# Patient Record
Sex: FEMALE | Race: BLACK OR AFRICAN AMERICAN | NOT HISPANIC OR LATINO | Employment: FULL TIME | URBAN - METROPOLITAN AREA
[De-identification: names, ages, dates, MRNs, and addresses within clinical notes are randomized per-mention and may not be internally consistent; named-entity substitution may affect disease eponyms.]

---

## 2018-08-28 ENCOUNTER — OFFICE VISIT (OUTPATIENT)
Dept: URGENT CARE | Facility: CLINIC | Age: 33
End: 2018-08-28
Payer: COMMERCIAL

## 2018-08-28 VITALS
TEMPERATURE: 98 F | WEIGHT: 201.25 LBS | BODY MASS INDEX: 28.81 KG/M2 | HEART RATE: 96 BPM | RESPIRATION RATE: 16 BRPM | HEIGHT: 70 IN | OXYGEN SATURATION: 99 %

## 2018-08-28 DIAGNOSIS — B34.9 VIRAL SYNDROME: Primary | ICD-10-CM

## 2018-08-28 DIAGNOSIS — Z20.818 EXPOSURE TO STREP THROAT: ICD-10-CM

## 2018-08-28 LAB
CTP QC/QA: YES
S PYO RRNA THROAT QL PROBE: NEGATIVE

## 2018-08-28 PROCEDURE — 87880 STREP A ASSAY W/OPTIC: CPT | Mod: QW,S$GLB,, | Performed by: NURSE PRACTITIONER

## 2018-08-28 PROCEDURE — 99214 OFFICE O/P EST MOD 30 MIN: CPT | Mod: S$GLB,,, | Performed by: NURSE PRACTITIONER

## 2018-08-28 PROCEDURE — 99999 PR PBB SHADOW E&M-NEW PATIENT-LVL IV: CPT | Mod: PBBFAC,,, | Performed by: NURSE PRACTITIONER

## 2018-08-28 PROCEDURE — 87081 CULTURE SCREEN ONLY: CPT

## 2018-08-28 RX ORDER — OSELTAMIVIR PHOSPHATE 75 MG/1
75 CAPSULE ORAL 2 TIMES DAILY
Qty: 10 CAPSULE | Refills: 0 | Status: SHIPPED | OUTPATIENT
Start: 2018-08-28 | End: 2018-09-02

## 2018-08-28 NOTE — PROGRESS NOTES
"Subjective:      Patient ID: Saad Garvin is a 33 y.o. female.    Chief Complaint: No chief complaint on file.    Headache    This is a new problem. The current episode started in the past 7 days (2 days). The problem occurs constantly. The problem has been unchanged. The pain quality is similar to prior headaches. Associated symptoms include muscle aches and nausea. Pertinent negatives include no abdominal pain, coughing, ear pain, eye pain, eye redness, phonophobia, photophobia, sinus pressure or sore throat. Associated symptoms comments: Work contacts with strep.. Nothing aggravates the symptoms. Treatments tried: ibuprofen last night for the headache and body aches; also taking Zofran for the nausea. The treatment provided moderate relief.     Review of Systems   Constitutional: Positive for chills.   HENT: Negative.  Negative for congestion, ear pain, sinus pressure and sore throat.    Eyes: Negative.  Negative for photophobia, pain and redness.   Respiratory: Negative.  Negative for cough.    Cardiovascular: Negative.    Gastrointestinal: Positive for nausea. Negative for abdominal pain.   Genitourinary: Negative.    Musculoskeletal: Positive for myalgias.   Skin: Negative.    Neurological: Positive for headaches.   Hematological: Negative.        Objective:   Pulse 96   Temp 97.5 °F (36.4 °C) (Tympanic)   Resp 16   Ht 5' 9.5" (1.765 m)   Wt 91.3 kg (201 lb 4.5 oz)   LMP 08/24/2018   SpO2 99%   BMI 29.30 kg/m²   Physical Exam   Constitutional: She is oriented to person, place, and time. She appears well-developed and well-nourished. No distress.   HENT:   Head: Normocephalic and atraumatic.   Right Ear: Tympanic membrane normal.   Left Ear: Tympanic membrane normal.   Nose: Nose normal. No mucosal edema or sinus tenderness.   Mouth/Throat: Uvula is midline, oropharynx is clear and moist and mucous membranes are normal.   Eyes: Conjunctivae are normal.   Neck: Normal range of motion. Neck supple. "   Cardiovascular: Normal rate, regular rhythm and normal heart sounds.   Pulmonary/Chest: Effort normal and breath sounds normal. No respiratory distress.   Abdominal: Soft. Bowel sounds are normal. She exhibits no distension. Mass: has a history of large fibroids; palpable in suprapubic region. There is no tenderness. There is no rebound and no guarding. No hernia.   Musculoskeletal: Normal range of motion. She exhibits no edema.   Lymphadenopathy:     She has no cervical adenopathy.   Neurological: She is alert and oriented to person, place, and time.   Skin: Skin is warm and dry. She is not diaphoretic.   Nursing note and vitals reviewed.    Assessment:      1. Viral syndrome    2. Exposure to strep throat       Plan:   Viral syndrome  -     oseltamivir (TAMIFLU) 75 MG capsule; Take 1 capsule (75 mg total) by mouth 2 (two) times daily. for 5 days  Dispense: 10 capsule; Refill: 0    Exposure to strep throat  -     POCT Rapid Strep A  -     Strep A culture, throat    Printed Rx for Tamiflu. Ms. Garvin will fill the Tamiflu if she starts to run fever.   Supportive care measures: increase fluids, Ibuprofen PRN headache/body aches, Zofran for nausea.  Ms. Garvin has enough Zofran at home -- she'll call back if she starts running low.  Rapid strep is negative. Culture pending.  Instructions, follow up, and supportive care as per AVS.

## 2018-08-28 NOTE — LETTER
August 28, 2018      [facility]  [address] [facility] [address]  [place] [address]  Phone: [phone]  Fax: [phone]       Patient: Saad Garvin   YOB: 1985  Date of Visit: 08/28/2018    To Whom It May Concern:    Stacy Garvin  was at [facility] on 08/28/2018. She may return to work/school on 8/30/18 with no restrictions. If you have any questions or concerns, or if I can be of further assistance, please do not hesitate to contact me.    Sincerely,    Babita Ferris, NP

## 2018-08-28 NOTE — PATIENT INSTRUCTIONS
"· Your symptoms are likely due to a viral infection. These infections can last up to 14 days, but you should notice some improvement of your symptoms within the first 7-10 days. Viral infections will not improve with antibiotics. If your symptoms persist >10 days without improvement or if you have any new or worsening symptoms, this is an indication that you may have developed a bacterial infection and should return to your primary care provider or to Urgent Care.   · Getting plenty of rest is very important to fighting infections.  · Increase fluids.   · May apply warm compresses as needed for facial pain and congestion.   · Saline nasal spray to loosen nasal congestion.  · Flonase or Nasacort to reduce inflammation in the sinus cavities.  · You may take an over the counter antihistamine for allergy symptoms such as sneezing, itchy/watery eyes, scratchy throat, or congestion.  · Take Tylenol or Ibuprofen as needed for sore throat, body aches, or fever.  · Don't drive, drink alcohol, or take any other medication or substance that causes sedation while taking cough syrup.   · Follow up with your primary care provider if symptoms persist >10 days or sooner for any new or worsening symptoms.   · Go to the ER for any fever that does not improve with Tylenol/Ibuprofen, neck stiffness, rash, severe headache, vision changes, shortness of breath, chest pain, facial swelling, severe facial pain, or any other new and concerning symptoms.     Viral Syndrome (Adult)  A viral illness may cause a number of symptoms. The symptoms depend on the part of the body that the virus affects. If it settles in your nose, throat, and lungs, it may cause cough, sore throat, congestion, and sometimes headache. If it settles in your stomach and intestinal tract, it may cause vomiting and diarrhea. Sometimes it causes vague symptoms like "aching all over," feeling tired, loss of appetite, or fever.  A viral illness usually lasts 1 to 2 weeks, " but sometimes it lasts longer. In some cases, a more serious infection can look like a viral syndrome in the first few days of the illness. You may need another exam and additional tests to know the difference. Watch for the warning signs listed below.  Home care  Follow these guidelines for taking care of yourself at home:  · If symptoms are severe, rest at home for the first 2 to 3 days.  · Stay away from cigarette smoke - both your smoke and the smoke from others.  · You may use over-the-counter acetaminophen or ibuprofen for fever, muscle aching, and headache, unless another medicine was prescribed for this. If you have chronic liver or kidney disease or ever had a stomach ulcer or GI bleeding, talk with your doctor before using these medicines. No one who is younger than 18 and ill with a fever should take aspirin. It may cause severe disease or death.  · Your appetite may be poor, so a light diet is fine. Avoid dehydration by drinking 8 to 12 8-ounce glasses of fluids each day. This may include water; orange juice; lemonade; apple, grape, and cranberry juice; clear fruit drinks; electrolyte replacement and sports drinks; and decaffeinated teas and coffee. If you have been diagnosed with a kidney disease, ask your doctor how much and what types of fluids you should drink to prevent dehydration. If you have kidney disease, drinking too much fluid can cause it build up in the your body and be dangerous to your health.  · Over-the-counter remedies won't shorten the length of the illness but may be helpful for cough, sore throat; and nasal and sinus congestion. Don't use decongestants if you have high blood pressure.  Follow-up care  Follow up with your healthcare provider if you do not improve over the next week.  Call 911  Get emergency medical care if any of the following occur:  · Convulsion  · Feeling weak, dizzy, or like you are going to faint  · Chest pain, shortness of breath, wheezing, or difficulty  breathing  When to seek medical advice  Call your healthcare provider right away if any of these occur:  · Cough with lots of colored sputum (mucus) or blood in your sputum  · Chest pain, shortness of breath, wheezing, or difficulty breathing  · Severe headache; face, neck, or ear pain  · Severe, constant pain in the lower right side of your belly (abdominal)  · Continued vomiting (cant keep liquids down)  · Frequent diarrhea (more than 5 times a day); blood (red or black color) or mucus in diarrhea  · Feeling weak, dizzy, or like you are going to faint  · Extreme thirst  · Fever of 100.4°F (38°C) or higher, or as directed by your healthcare provider  Date Last Reviewed: 9/25/2015  © 5767-0695 [employer]. [address] [place] [address], [place] [address]. All rights reserved. This information is not intended as a substitute for professional medical care. Always follow your healthcare professional's instructions.

## 2018-08-30 LAB — BACTERIA THROAT CULT: NORMAL

## 2018-11-11 ENCOUNTER — OFFICE VISIT (OUTPATIENT)
Dept: URGENT CARE | Facility: CLINIC | Age: 33
End: 2018-11-11
Payer: COMMERCIAL

## 2018-11-11 VITALS
RESPIRATION RATE: 16 BRPM | HEIGHT: 70 IN | DIASTOLIC BLOOD PRESSURE: 74 MMHG | WEIGHT: 202.25 LBS | OXYGEN SATURATION: 99 % | SYSTOLIC BLOOD PRESSURE: 112 MMHG | HEART RATE: 106 BPM | BODY MASS INDEX: 28.95 KG/M2 | TEMPERATURE: 98 F

## 2018-11-11 DIAGNOSIS — H66.91 OTITIS OF RIGHT EAR: ICD-10-CM

## 2018-11-11 DIAGNOSIS — J01.90 ACUTE SINUSITIS, RECURRENCE NOT SPECIFIED, UNSPECIFIED LOCATION: Primary | ICD-10-CM

## 2018-11-11 PROCEDURE — 99214 OFFICE O/P EST MOD 30 MIN: CPT | Mod: S$GLB,,, | Performed by: NURSE PRACTITIONER

## 2018-11-11 PROCEDURE — 99999 PR PBB SHADOW E&M-EST. PATIENT-LVL III: CPT | Mod: PBBFAC,,, | Performed by: NURSE PRACTITIONER

## 2018-11-11 PROCEDURE — 3008F BODY MASS INDEX DOCD: CPT | Mod: CPTII,S$GLB,, | Performed by: NURSE PRACTITIONER

## 2018-11-11 RX ORDER — CIPROFLOXACIN AND DEXAMETHASONE 3; 1 MG/ML; MG/ML
4 SUSPENSION/ DROPS AURICULAR (OTIC) 2 TIMES DAILY
Qty: 7.5 ML | Refills: 0 | Status: SHIPPED | OUTPATIENT
Start: 2018-11-11 | End: 2019-03-15

## 2018-11-11 RX ORDER — LEVOFLOXACIN 500 MG/1
500 TABLET, FILM COATED ORAL DAILY
Qty: 5 TABLET | Refills: 0 | Status: SHIPPED | OUTPATIENT
Start: 2018-11-11 | End: 2019-03-15

## 2018-11-11 NOTE — PROGRESS NOTES
Subjective:       Patient ID: Saad Garvin is a 33 y.o. female.    Chief Complaint: Ear Drainage (right ear); Ear Fullness; and Otalgia    Patient presents with right ear pain that started yesterday.  Reports severe pain last night with some relief after having some drainage.        Otalgia    There is pain in the right ear. This is a new problem. The current episode started yesterday. The problem has been unchanged. There has been no fever. Associated symptoms include rhinorrhea. Pertinent negatives include no coughing. Treatments tried: Allegra  The treatment provided no relief.   Sinusitis   This is a new problem. The current episode started 1 to 4 weeks ago. The problem is unchanged. There has been no fever. Associated symptoms include congestion, ear pain and sinus pressure. Pertinent negatives include no chills, coughing or shortness of breath. Treatments tried: antihistamine. The treatment provided no relief.     Review of Systems   Constitutional: Negative for chills and fatigue.   HENT: Positive for congestion, ear pain, postnasal drip, rhinorrhea and sinus pressure.    Respiratory: Negative for cough and shortness of breath.    Musculoskeletal: Negative for arthralgias and gait problem.   Skin: Negative for color change and wound.   Psychiatric/Behavioral: Negative for agitation and confusion.       Objective:      Physical Exam   Constitutional: She is oriented to person, place, and time. Vital signs are normal. She appears well-developed and well-nourished.   HENT:   Head: Normocephalic and atraumatic.   Right Ear: Hearing and external ear normal. There is drainage.   Left Ear: Hearing, tympanic membrane, external ear and ear canal normal.   Nose: Mucosal edema and rhinorrhea present.   Neck: Normal range of motion.   Cardiovascular: Normal rate and regular rhythm.   Pulmonary/Chest: Effort normal and breath sounds normal.   Musculoskeletal: Normal range of motion.   Neurological: She is alert and  oriented to person, place, and time.   Skin: Skin is warm.   Psychiatric: She has a normal mood and affect. Her behavior is normal.       Assessment:       1. Acute sinusitis, recurrence not specified, unspecified location    2. Otitis of right ear        Plan:         Acute sinusitis, recurrence not specified, unspecified location  -     levoFLOXacin (LEVAQUIN) 500 MG tablet; Take 1 tablet (500 mg total) by mouth once daily.  Dispense: 5 tablet; Refill: 0    Otitis of right ear  -     ciprofloxacin-dexamethasone 0.3-0.1% (CIPRODEX) 0.3-0.1 % DrpS; Place 4 drops into both ears 2 (two) times daily. For 7 days  Dispense: 7.5 mL; Refill: 0        Instructed to take all medications as ordered.  Informed if no improvement or symptoms worsens to follow up with primary care physician.  Follow up in 1-2 weeks with PCP.   Printed and review after visit summary with patient.

## 2019-03-15 ENCOUNTER — OFFICE VISIT (OUTPATIENT)
Dept: INTERNAL MEDICINE | Facility: CLINIC | Age: 34
End: 2019-03-15
Payer: COMMERCIAL

## 2019-03-15 VITALS
TEMPERATURE: 97 F | OXYGEN SATURATION: 98 % | SYSTOLIC BLOOD PRESSURE: 104 MMHG | BODY MASS INDEX: 29.29 KG/M2 | HEIGHT: 70 IN | DIASTOLIC BLOOD PRESSURE: 78 MMHG | HEART RATE: 93 BPM | WEIGHT: 204.56 LBS

## 2019-03-15 DIAGNOSIS — M54.50 ACUTE BILATERAL LOW BACK PAIN WITHOUT SCIATICA: Primary | ICD-10-CM

## 2019-03-15 PROCEDURE — 99214 OFFICE O/P EST MOD 30 MIN: CPT | Mod: 25,S$GLB,, | Performed by: PHYSICIAN ASSISTANT

## 2019-03-15 PROCEDURE — 96372 PR INJECTION,THERAP/PROPH/DIAG2ST, IM OR SUBCUT: ICD-10-PCS | Mod: S$GLB,,, | Performed by: PHYSICIAN ASSISTANT

## 2019-03-15 PROCEDURE — 99999 PR PBB SHADOW E&M-EST. PATIENT-LVL III: CPT | Mod: PBBFAC,,, | Performed by: PHYSICIAN ASSISTANT

## 2019-03-15 PROCEDURE — 3008F BODY MASS INDEX DOCD: CPT | Mod: CPTII,S$GLB,, | Performed by: PHYSICIAN ASSISTANT

## 2019-03-15 PROCEDURE — 96372 THER/PROPH/DIAG INJ SC/IM: CPT | Mod: S$GLB,,, | Performed by: PHYSICIAN ASSISTANT

## 2019-03-15 PROCEDURE — 99214 PR OFFICE/OUTPT VISIT, EST, LEVL IV, 30-39 MIN: ICD-10-PCS | Mod: 25,S$GLB,, | Performed by: PHYSICIAN ASSISTANT

## 2019-03-15 PROCEDURE — 3008F PR BODY MASS INDEX (BMI) DOCUMENTED: ICD-10-PCS | Mod: CPTII,S$GLB,, | Performed by: PHYSICIAN ASSISTANT

## 2019-03-15 PROCEDURE — 99999 PR PBB SHADOW E&M-EST. PATIENT-LVL III: ICD-10-PCS | Mod: PBBFAC,,, | Performed by: PHYSICIAN ASSISTANT

## 2019-03-15 RX ORDER — KETOROLAC TROMETHAMINE 30 MG/ML
60 INJECTION, SOLUTION INTRAMUSCULAR; INTRAVENOUS
Status: COMPLETED | OUTPATIENT
Start: 2019-03-15 | End: 2019-03-15

## 2019-03-15 RX ORDER — NAPROXEN 500 MG/1
500 TABLET ORAL 2 TIMES DAILY WITH MEALS
Qty: 14 TABLET | Refills: 0 | Status: SHIPPED | OUTPATIENT
Start: 2019-03-15 | End: 2019-03-22

## 2019-03-15 RX ORDER — FEXOFENADINE HCL AND PSEUDOEPHEDRINE HCI 60; 120 MG/1; MG/1
1 TABLET, EXTENDED RELEASE ORAL 2 TIMES DAILY
COMMUNITY
End: 2022-07-14

## 2019-03-15 RX ADMIN — KETOROLAC TROMETHAMINE 60 MG: 30 INJECTION, SOLUTION INTRAMUSCULAR; INTRAVENOUS at 03:03

## 2019-03-15 NOTE — PATIENT INSTRUCTIONS
Back Care Tips    Caring for your back  These are things you can do to prevent a recurrence of acute back pain and to reduce symptoms from chronic back pain:  · Maintain a healthy weight. If you are overweight, losing weight will help most types of back pain.  · Exercise is an important part of recovery from most types of back pain. The muscles behind and in front of the spine support the back. This means strengthening both the back muscles and the abdominal muscles will provide better support for your spine.   · Swimming and brisk walking are good overall exercises to improve your fitness level.  · Practice safe lifting methods (below).  · Practice good posture when sitting, standing and walking. Avoid prolonged sitting. This puts more stress on the lower back than standing or walking.  · Wear quality shoes with sufficient arch support. Foot and ankle alignment can affect back symptoms. Women should avoid wearing high heels.  · Therapeutic massage can help relax the back muscles without stretching them.  · During the first 24 to 72 hours after an acute injury or flare-up of chronic back pain, apply an ice pack to the painful area for 20 minutes and then remove it for 20 minutes, over a period of 60 to 90 minutes, or several times a day. As a safety precaution, do not use a heating pad at bedtime. Sleeping on a heating pad can lead to skin burns or tissue damage.  · You can alternate ice and heat therapies.  Medications  Talk to your healthcare provider before using medicines, especially if you have other medical problems or are taking other medicines.  · You may use acetaminophen or ibuprofen to control pain, unless your healthcare provider prescribed other pain medicine. If you have chronic conditions like diabetes, liver or kidney disease, stomach ulcers, or gastrointestinal bleeding, or are taking blood thinners, talk with your healthcare provider before taking any medicines.  · Be careful if you are given  prescription pain medicines, narcotics, or medicine for muscle spasm. They can cause drowsiness, affect your coordination, reflexes, and judgment. Do not drive or operate heavy machinery while taking these types of medicines. Take prescription pain medicine only as prescribed by your healthcare provider.  Lumbar stretch  Here is a simple stretching exercise that will help relax muscle spasm and keep your back more limber. If exercise makes your back pain worse, dont do it.  · Lie on your back with your knees bent and both feet on the ground.  · Slowly raise your left knee to your chest as you flatten your lower back against the floor. Hold for 5 seconds.  · Relax and repeat the exercise with your right knee.  · Do 10 of these exercises for each leg.  Safe lifting method  · Dont bend over at the waist to lift an object off the floor.  Instead, bend your knees and hips in a squat.   · Keep your back and head upright  · Hold the object close to your body, directly in front of you.  · Straighten your legs to lift the object.   · Lower the object to the floor in the reverse fashion.  · If you must slide something across the floor, push it.  Posture tips  Sitting  Sit in chairs with straight backs or low-back support. Keep your knees lower than your hips, with your feet flat on the floor.  When driving, sit up straight. Adjust the seat forward so you are not leaning toward the steering wheel.  A small pillow or rolled towel behind your lower back may help if you are driving long distances.   Standing  When standing for long periods, shift most of your weight to one leg at a time. Alternate legs every few minutes.   Sleeping  The best way to sleep is on your side with your knees bent. Put a low pillow under your head to support your neck in a neutral spine position. Avoid thick pillows that bend your neck to one side. Put a pillow between your legs to further relax your lower back. If you sleep on your back, put pillows  under your knees to support your legs in a slightly flexed position. Use a firm mattress. If your mattress sags, replace it, or use a 1/2-inch plywood board under the mattress to add support.  Follow-up care  Follow up with your healthcare provider, or as advised.  If X-rays, a CT scan or an MRI scan were taken, they will be reviewed by a radiologist. You will be notified of any new findings that may affect your care.  Call 911  Seek emergency medical care if any of the following occur:  · Trouble breathing  · Confusion  · Very drowsy  · Fainting or loss of consciousness  · Rapid or very slow heart rate  · Loss of  bowel or bladder control  When to seek medical care  Call your healthcare provider if any of the following occur:  · Pain becomes worse or spreads to your arms or legs  · Weakness or numbness in one or both arms or legs  · Numbness in the groin area  Date Last Reviewed: 6/1/2016  © 5684-3997 [employer]. [address] [place] [address]. All rights reserved. This information is not intended as a substitute for professional medical care. Always follow your healthcare professional's instructions.        Back Pain (Acute or Chronic)    Back pain is one of the most common problems. The good news is that most people feel better in 1 to 2 weeks, and most of the rest in 1 to 2 months. Most people can remain active.  People experience and describe pain differently; not everyone is the same.  · The pain can be sharp, stabbing, shooting, aching, cramping or burning.  · Movement, standing, bending, lifting, sitting, or walking may worsen pain.  · It can be localized to one spot or area, or it can be more generalized.  · It can spread or radiate upwards, to the front, or go down your arms or legs (sciatica).  · It can cause muscle spasm.  Most of the time, mechanical problems with the muscles or spine cause the pain. Mechanical problems are usually caused by an injury to the muscles or  ligaments. While illness can cause back pain, it is usually not caused by a serious illness. Mechanical problems include:   · Physical activity such as sports, exercise, work, or normal activity  · Overexertion, lifting, pushing, pulling incorrectly or too aggressively  · Sudden twisting, bending, or stretching from an accident, or accidental movement  · Poor posture  · Stretching or moving wrong, without noticing pain at the time  · Poor coordination, lack of regular exercise (check with your doctor about this)  · Spinal disc disease or arthritis  · Stress  Pain can also be related to pregnancy, or illness like appendicitis, bladder or kidney infections, pelvic infections, and many other things.  Acute back pain usually gets better in 1 to 2 weeks. Back pain related to disk disease, arthritis in the spinal joints or spinal stenosis (narrowing of the spinal canal) can become chronic and last for months or years.  Unless you had a physical injury (for example, a car accident or fall) X-rays are usually not needed for the initial evaluation of back pain. If pain continues and does not respond to medical treatment, X-rays and other tests may be needed.  Home care  Try these home care recommendations:  · When in bed, try to find a position of comfort. A firm mattress is best. Try lying flat on your back with pillows under your knees. You can also try lying on your side with your knees bent up towards your chest and a pillow between your knees.  · At first, do not try to stretch out the sore spots. If there is a strain, it is not like the good soreness you get after exercising without an injury. In this case, stretching may make it worse.  · Avoid prolong sitting, long car rides, or travel. This puts more stress on the lower back than standing or walking.  · During the first 24 to 72 hours after an acute injury or flare up of chronic back pain, apply an ice pack to the painful area for 20 minutes and then remove it for  20 minutes. Do this over a period of 60 to 90 minutes or several times a day. This will reduce swelling and pain. Wrap the ice pack in a thin towel or plastic to protect your skin.  · You can start with ice, then switch to heat. Heat (hot shower, hot bath, or heating pad) reduces pain and works well for muscle spasms. Heat can be applied to the painful area for 20 minutes then remove it for 20 minutes. Do this over a period of 60 to 90 minutes or several times a day. Do not sleep on a heating pad. It can lead to skin burns or tissue damage.  · You can alternate ice and heat therapy. Talk with your doctor about the best treatment for your back pain.  · Therapeutic massage can help relax the back muscles without stretching them.  · Be aware of safe lifting methods and do not lift anything without stretching first.  Medicines  Talk to your doctor before using medicine, especially if you have other medical problems or are taking other medicines.  · You may use over-the-counter medicine as directed on the bottle to control pain, unless another pain medicine was prescribed. If you have chronic conditions like diabetes, liver or kidney disease, stomach ulcers, or gastrointestinal bleeding, or are taking blood thinners, talk to your doctor before taking any medicine.  · Be careful if you are given a prescription medicines, narcotics, or medicine for muscle spasms. They can cause drowsiness, affect your coordination, reflexes, and judgement. Do not drive or operate heavy machinery.  Follow-up care  Follow up with your healthcare provider, or as advised.   A radiologist will review any X-rays that were taken. Your provide will notify you of any new findings that may affect your care.  Call 911  Call emergency services if any of the following occur:  · Trouble breathing  · Confusion  · Very drowsy or trouble awakening  · Fainting or loss of consciousness  · Rapid or very slow heart rate  · Loss of bowel or bladder  control  When to seek medical advice  Call your healthcare provider right away if any of these occur:   · Pain becomes worse or spreads to your legs  · Weakness or numbness in one or both legs  · Numbness in the groin or genital area  Date Last Reviewed: 7/1/2016  © 2094-0724 [employer]. [address] [place] [address]. All rights reserved. This information is not intended as a substitute for professional medical care. Always follow your healthcare professional's instructions.        Possible Causes of Low Back or Leg Pain    The symptoms in your back or leg may be due to pressure on a nerve. This pressure may be caused by a damaged disk or by abnormal bone growth. Either way, you may feel pain, burning, tingling, or numbness. If you have pressure on a nerve that connects to the sciatic nerve, pain may shoot down your leg.    Pressure from the disk  Constant wear and tear can weaken a disk over time and cause back pain. The disk can then be damaged by a sudden movement or injury. If its soft center begins to bulge, the disk may press on a nerve. Or the outside of the disk may tear, and the soft center may squeeze through and pinch a nerve.    Pressure from bone  As a disk wears out, the vertebrae right above and below the disk begin to touch. This can put pressure on a nerve. Often, abnormal bone (called bone spurs) grows where the vertebrae rub against each other. This can cause the foramen or the spinal canal to narrow (called stenosis) and press against a nerve.  Date Last Reviewed: 10/4/2015  © 7139-9382 [employer]. [address] [place] [address]. All rights reserved. This information is not intended as a substitute for professional medical care. Always follow your healthcare professional's instructions.        Relieving Back Pain  Back pain is a common problem. You can strain back muscles by lifting too much weight or just by moving the wrong way. Back strain  can be uncomfortable, even painful. And it can take weeks or months to improve. To help yourself feel better and prevent future back strains, try these tips.  Important Note: Do not give aspirin to children or teens without first discussing it with your healthcare provider.      ? Ice    Ice reduces muscle pain and swelling. It helps most during the first 24 to 48 hours after an injury.  · Wrap an ice pack or a bag of frozen peas in a thin towel. (Never place ice directly on your skin.)  · Place the ice where your back hurts the most.  · Dont ice for more than 20 minutes at a time.  · You can use ice several times a day.  ? Medicines  Over-the-counter pain relievers can include acetaminophen and anti-inflammatory medicines, which includes aspirin or ibuprofen. They can help ease discomfort. Some also reduce swelling.  · Tell your healthcare provider about any medicines you are already taking.  · Take medicines only as directed.  ? Heat  After the first 48 hours, heat can relax sore muscles and improve blood flow.  · Try a warm bath or shower. Or use a heating pad set on low. To prevent a burn, keep a cloth between you and the heating pad.  · Dont use a heating pad for more than 15 minutes at a time. Never sleep on a heating pad.  Date Last Reviewed: 9/1/2015  © 2580-1518 [employer]. [address] [place] [address], [place] [address]. All rights reserved. This information is not intended as a substitute for professional medical care. Always follow your healthcare professional's instructions.        Caring for Your Back Throughout the Day  Take care of your back throughout the day. You will likely have fewer back problems if you do. Try to warm up before you move. Shift positions often. Also do your best to form healthy habits.    Warm up for the day  Do a few slow, catlike stretches before starting your day. This simple warmup can soften your disks, stretch your back muscles, and help prevent injuries.  Shift  positions often  At work and at home, change positions often. This helps keep your body from getting stiff. Stand up or lean back while you sit. If you can, get up and move every 1/2 hour.  Form healthy habits  Here are some suggestions:   · Keep a healthy weight. When you weigh too much, your back is under excess strain. But losing just a few extra pounds can help a lot.  · Try not to overeat. Learn about serving sizes. The size of a serving depends on the food and the food group. Many foods list serving sizes on the labels.  · Handle minor aches with cold and heat. Apply cold the first 24 to 48 hours. Use heat after that. Always place a thin cloth between your skin and the source of cold or heat.  · Take medicines as directed. This helps keep pain under control. Always read labels, and call your healthcare provider or pharmacist if you have any questions.  Walk each day  A daily walk keeps your back and thigh muscles stretched and strong. This gives your back better support. Be sure to walk with your spines three curves aligned, by keeping your head, hips, and toes connected by a vertical line.   Date Last Reviewed: 10/18/2015  © 8618-5365 [employer]. [address] [place] [address]. All rights reserved. This information is not intended as a substitute for professional medical care. Always follow your healthcare professional's instructions.        Back Safety: Poor Posture Hurts  An unhealthy spine often starts with bad habits. Poor movement patterns and posture problems are common causes of back pain. Disk, bone, nerve, and soft tissue problems can all be affected by poor posture. They can lead to pain, stiffness, and other symptoms.    Poor posture backfires  Poor posture can cause pain. Too much slouching puts increased pressure on the disks. An excessive lumbar curve can overload and inflame the vertebrae. As a result, the back muscles may tighten or spasm to splint and protect the  spine. This adds to the pain you feel.    Proper posture: The key to safe movement  Your spine bears your weight throughout the day. This is true whether youre sleeping, standing, or bending. Certain positions strain your spine more than others. But by maintaining proper posture in all positions, you can reduce the stress on your spine.       To improve your standing posture, follow these steps:  · Breathe deeply.  · Relax your shoulders, hips, and ankles. · Think of the ears, shoulders, hips, and ankles as a series of dots. Now, adjust your body to connect the dots in a straight line.  · Tuck your buttocks in just a bit if you need to.      Date Last Reviewed: 10/28/2015  © 4692-4017 [employer]. [address] [place] [address], [place] [address]. All rights reserved. This information is not intended as a substitute for professional medical care. Always follow your healthcare professional's instructions.        Relieving Tension in Your Back  Being relaxed helps keep your mind healthy and your back ready to move. Take short breaks often. Walk around. Stretch. Switch tasks. Also give the following a try.  Make time to relax. Start by setting aside 5 minutes daily.   Deep breathing    Deep breathing is a simple way to reduce stress. You can do it almost any time you need to relax.  · Inhale slowly through your nose. Let your lungs and stomach expand.  · Hold your breath for 2 to 3 seconds.  · Exhale slowly through your mouth until your lungs feel empty. Repeat 3 to 4 times.  Relieve tension  Muscle tension can create tender spots called trigger points. The tips below may help relieve muscle tension.  · Press the trigger point if you can reach it. If not, lie on a soft tennis ball, or ask a friend to press the spot. Use steady pressure for 10 to 15 seconds. Breathe deeply. Repeat a few times.  · Massage trigger points with ice for 2 to 5 minutes. Press lightly at first. Slowly increase firmness.  Date Last  Reviewed: 10/18/2015  © 1684-0738 [employer] [place]. [address] [place] [address], [place] [address]. All rights reserved. This information is not intended as a substitute for professional medical care. Always follow your healthcare professional's instructions.

## 2019-03-15 NOTE — PROGRESS NOTES
Subjective:      Patient ID: Saad Garvin is a 33 y.o. female.    Chief Complaint: Back Pain and Allergies (seasonal)    Back Pain   This is a new problem. The current episode started in the past 7 days. The problem occurs constantly. The problem has been gradually worsening since onset. The pain is present in the lumbar spine. The quality of the pain is described as aching and stabbing. The symptoms are aggravated by stress and twisting. Pertinent negatives include no abdominal pain, bladder incontinence, bowel incontinence, chest pain, dysuria, fever, headaches, leg pain, numbness, paresis, paresthesias, pelvic pain, perianal numbness, tingling, weakness or weight loss. She has tried NSAIDs for the symptoms. The treatment provided no relief.       Review of Systems   Constitutional: Negative for activity change, appetite change, chills, diaphoresis, fatigue, fever, unexpected weight change and weight loss.   HENT: Negative.  Negative for congestion, hearing loss, postnasal drip, rhinorrhea, sore throat, trouble swallowing and voice change.    Eyes: Negative.  Negative for visual disturbance.   Respiratory: Negative.  Negative for cough, choking, chest tightness and shortness of breath.    Cardiovascular: Negative for chest pain, palpitations and leg swelling.   Gastrointestinal: Negative for abdominal distention, abdominal pain, blood in stool, bowel incontinence, constipation, diarrhea, nausea and vomiting.   Endocrine: Negative for cold intolerance, heat intolerance, polydipsia and polyuria.   Genitourinary: Negative.  Negative for bladder incontinence, difficulty urinating, dysuria, flank pain, frequency and pelvic pain.   Musculoskeletal: Positive for back pain. Negative for arthralgias, gait problem, joint swelling and myalgias.   Skin: Negative for color change, pallor, rash and wound.   Neurological: Negative for dizziness, tingling, tremors, weakness, light-headedness, numbness, headaches and  "paresthesias.   Hematological: Negative for adenopathy.   Psychiatric/Behavioral: Negative for behavioral problems, confusion, self-injury, sleep disturbance and suicidal ideas. The patient is not nervous/anxious.      Objective:   /78   Pulse 93   Temp 96.7 °F (35.9 °C) (Tympanic)   Ht 5' 9.5" (1.765 m)   Wt 92.8 kg (204 lb 9.4 oz)   LMP 02/28/2019   SpO2 98%   BMI 29.78 kg/m²     Physical Exam   Constitutional: She is oriented to person, place, and time. She appears well-developed and well-nourished.   HENT:   Head: Normocephalic and atraumatic.   Cardiovascular: Normal rate, regular rhythm and normal heart sounds. Exam reveals no gallop and no friction rub.   No murmur heard.  Pulmonary/Chest: Effort normal and breath sounds normal. No respiratory distress. She has no wheezes. She has no rales. She exhibits no tenderness.   Abdominal: Soft. She exhibits no distension. There is no tenderness.   Musculoskeletal: Normal range of motion.        Right hip: Normal. She exhibits normal range of motion, normal strength and no tenderness.        Left hip: Normal. She exhibits normal range of motion, normal strength and no tenderness.        Lumbar back: She exhibits tenderness, bony tenderness, pain and spasm. She exhibits normal range of motion, no swelling, no edema, no deformity, no laceration and normal pulse.   Neurological: She is alert and oriented to person, place, and time.   Skin: Skin is warm and dry. No rash noted.   Psychiatric: She has a normal mood and affect. Her behavior is normal. Judgment and thought content normal.   Vitals reviewed.      Assessment:     1. Acute bilateral low back pain without sciatica      Plan:   Acute bilateral low back pain without sciatica  -     ketorolac injection 60 mg  -     naproxen (NAPROSYN) 500 MG tablet; Take 1 tablet (500 mg total) by mouth 2 (two) times daily with meals. for 7 days  Dispense: 14 tablet; Refill: 0    -alternate ice and heat  -Educational " handout on at-home conservative care, pertinent to the patients diagnosis today, was handed to the patient and discussed in detail.    Follow-up if symptoms worsen or fail to improve.

## 2019-06-03 ENCOUNTER — OFFICE VISIT (OUTPATIENT)
Dept: URGENT CARE | Facility: CLINIC | Age: 34
End: 2019-06-03
Payer: COMMERCIAL

## 2019-06-03 VITALS
TEMPERATURE: 98 F | OXYGEN SATURATION: 98 % | SYSTOLIC BLOOD PRESSURE: 118 MMHG | HEIGHT: 70 IN | DIASTOLIC BLOOD PRESSURE: 80 MMHG | BODY MASS INDEX: 29.63 KG/M2 | HEART RATE: 97 BPM | WEIGHT: 207 LBS

## 2019-06-03 DIAGNOSIS — B96.89 BACTERIAL LOWER RESPIRATORY INFECTION: Primary | ICD-10-CM

## 2019-06-03 DIAGNOSIS — B37.9 ANTIBIOTIC-INDUCED YEAST INFECTION: ICD-10-CM

## 2019-06-03 DIAGNOSIS — T36.95XA ANTIBIOTIC-INDUCED YEAST INFECTION: ICD-10-CM

## 2019-06-03 DIAGNOSIS — J01.00 ACUTE MAXILLARY SINUSITIS, RECURRENCE NOT SPECIFIED: ICD-10-CM

## 2019-06-03 DIAGNOSIS — J22 BACTERIAL LOWER RESPIRATORY INFECTION: Primary | ICD-10-CM

## 2019-06-03 PROCEDURE — 3008F PR BODY MASS INDEX (BMI) DOCUMENTED: ICD-10-PCS | Mod: CPTII,S$GLB,, | Performed by: PHYSICIAN ASSISTANT

## 2019-06-03 PROCEDURE — 99214 PR OFFICE/OUTPT VISIT, EST, LEVL IV, 30-39 MIN: ICD-10-PCS | Mod: S$GLB,,, | Performed by: PHYSICIAN ASSISTANT

## 2019-06-03 PROCEDURE — 99999 PR PBB SHADOW E&M-EST. PATIENT-LVL III: ICD-10-PCS | Mod: PBBFAC,,, | Performed by: PHYSICIAN ASSISTANT

## 2019-06-03 PROCEDURE — 3008F BODY MASS INDEX DOCD: CPT | Mod: CPTII,S$GLB,, | Performed by: PHYSICIAN ASSISTANT

## 2019-06-03 PROCEDURE — 99999 PR PBB SHADOW E&M-EST. PATIENT-LVL III: CPT | Mod: PBBFAC,,, | Performed by: PHYSICIAN ASSISTANT

## 2019-06-03 PROCEDURE — 99214 OFFICE O/P EST MOD 30 MIN: CPT | Mod: S$GLB,,, | Performed by: PHYSICIAN ASSISTANT

## 2019-06-03 RX ORDER — FLUCONAZOLE 150 MG/1
150 TABLET ORAL ONCE
Qty: 2 TABLET | Refills: 0 | Status: SHIPPED | OUTPATIENT
Start: 2019-06-03 | End: 2019-06-03

## 2019-06-03 RX ORDER — FLUTICASONE PROPIONATE 50 MCG
2 SPRAY, SUSPENSION (ML) NASAL DAILY
Qty: 16 G | Refills: 0 | Status: SHIPPED | OUTPATIENT
Start: 2019-06-03 | End: 2019-07-03

## 2019-06-03 RX ORDER — PREDNISONE 20 MG/1
20 TABLET ORAL DAILY
Qty: 5 TABLET | Refills: 0 | Status: SHIPPED | OUTPATIENT
Start: 2019-06-03 | End: 2019-06-08

## 2019-06-03 RX ORDER — DOXYCYCLINE 100 MG/1
100 CAPSULE ORAL EVERY 12 HOURS
Qty: 14 CAPSULE | Refills: 0 | Status: SHIPPED | OUTPATIENT
Start: 2019-06-03 | End: 2019-06-10

## 2019-06-03 NOTE — PROGRESS NOTES
"Subjective:      Patient ID: Saad Garvin is a 34 y.o. female.    Chief Complaint: Cough    URI    This is a new problem. Episode onset: two weeks ago  The problem has been gradually worsening. There has been no fever. Associated symptoms include congestion, coughing and rhinorrhea. Pertinent negatives include no chest pain, ear pain, headaches, sinus pain, sneezing, sore throat or wheezing. Treatments tried: mucinex d, allegra d, cold and flu OTC      Review of Systems   Constitutional: Negative for chills, diaphoresis, fatigue and fever.   HENT: Positive for congestion, postnasal drip, rhinorrhea and sinus pressure. Negative for ear discharge, ear pain, sinus pain, sneezing and sore throat.    Respiratory: Positive for cough. Negative for shortness of breath and wheezing.    Cardiovascular: Negative for chest pain and palpitations.   Musculoskeletal: Negative for back pain and myalgias.   Neurological: Negative for headaches.       Objective:   /80 (BP Location: Left arm, Patient Position: Sitting, BP Method: Medium (Manual))   Pulse 97   Temp 98.2 °F (36.8 °C) (Oral)   Ht 5' 9.5" (1.765 m)   Wt 93.9 kg (207 lb 0.2 oz)   SpO2 98%   BMI 30.13 kg/m²   Physical Exam   Constitutional: She is oriented to person, place, and time. She appears well-developed and well-nourished. No distress.   HENT:   Head: Normocephalic.   Right Ear: Tympanic membrane, external ear and ear canal normal.   Left Ear: Tympanic membrane, external ear and ear canal normal.   Nose: No mucosal edema or rhinorrhea. Right sinus exhibits maxillary sinus tenderness and frontal sinus tenderness. Left sinus exhibits maxillary sinus tenderness and frontal sinus tenderness.   Mouth/Throat: Uvula is midline, oropharynx is clear and moist and mucous membranes are normal. No oropharyngeal exudate, posterior oropharyngeal edema or posterior oropharyngeal erythema.   Eyes: Conjunctivae and EOM are normal.   Neck: Normal range of motion. " Neck supple.   Cardiovascular: Normal rate, regular rhythm and normal heart sounds.   Pulmonary/Chest: Effort normal and breath sounds normal. No accessory muscle usage. No apnea, no tachypnea and no bradypnea. No respiratory distress. She has no decreased breath sounds. She has no wheezes. She has no rhonchi. She has no rales.   Lymphadenopathy:        Head (right side): No submental, no submandibular and no tonsillar adenopathy present.        Head (left side): No submental, no submandibular and no tonsillar adenopathy present.     She has no cervical adenopathy.   Neurological: She is alert and oriented to person, place, and time.   Skin: Skin is warm and dry. She is not diaphoretic.     Assessment:      1. Bacterial lower respiratory infection    2. Acute maxillary sinusitis, recurrence not specified    3. Antibiotic-induced yeast infection       Plan:   Bacterial lower respiratory infection  -     doxycycline (VIBRAMYCIN) 100 MG Cap; Take 1 capsule (100 mg total) by mouth every 12 (twelve) hours. for 7 days  Dispense: 14 capsule; Refill: 0    Acute maxillary sinusitis, recurrence not specified  -     predniSONE (DELTASONE) 20 MG tablet; Take 1 tablet (20 mg total) by mouth once daily. for 5 days  Dispense: 5 tablet; Refill: 0    Antibiotic-induced yeast infection  -     fluconazole (DIFLUCAN) 150 MG Tab; Take 1 tablet (150 mg total) by mouth once. May repeat after 72 hours if symptoms persist for 1 dose  Dispense: 2 tablet; Refill: 0       -  discussed steroid for nasal passageway inflammation.  Patient informed of potential side effects of oral steroid including elevating blood pressure, increased blood glucose levels, increased risk of opportunistic infections, peptic ulcer disease and GI bleeding, insomnia, tremors, suppression of ones own steroid production, avascular necrosis, osteoporosis, increased intraocular pressure, etc. Patient verbalizes risk/benefit profile and agrees to oral steroid.    -   Doxycycline with cough x 2 weeks   -  Mucinex, increase fluids, flonase, humidifier, prop up at night   -  Diflucan for hx of antibiotic induced yeast infections     AVS provided and instructions reviewed with patient. Patient was counseled on supportive care and instructed to return or contact primary care provider if condition does not improve or for any new or worsening symptoms.    Flora Olmstead PA-C   Physician Assistant   [facility]

## 2019-08-16 ENCOUNTER — OFFICE VISIT (OUTPATIENT)
Dept: URGENT CARE | Facility: CLINIC | Age: 34
End: 2019-08-16
Payer: COMMERCIAL

## 2019-08-16 VITALS
OXYGEN SATURATION: 98 % | WEIGHT: 207 LBS | HEIGHT: 69 IN | HEART RATE: 105 BPM | BODY MASS INDEX: 30.66 KG/M2 | TEMPERATURE: 99 F | RESPIRATION RATE: 18 BRPM | DIASTOLIC BLOOD PRESSURE: 64 MMHG | SYSTOLIC BLOOD PRESSURE: 112 MMHG

## 2019-08-16 DIAGNOSIS — R52 GENERALIZED BODY ACHES: ICD-10-CM

## 2019-08-16 DIAGNOSIS — J06.9 UPPER RESPIRATORY TRACT INFECTION, UNSPECIFIED TYPE: Primary | ICD-10-CM

## 2019-08-16 LAB
CTP QC/QA: YES
POC MOLECULAR INFLUENZA A AGN: NEGATIVE
POC MOLECULAR INFLUENZA B AGN: NEGATIVE

## 2019-08-16 PROCEDURE — 87502 INFLUENZA DNA AMP PROBE: CPT | Mod: QW,S$GLB,, | Performed by: PHYSICIAN ASSISTANT

## 2019-08-16 PROCEDURE — 99214 PR OFFICE/OUTPT VISIT, EST, LEVL IV, 30-39 MIN: ICD-10-PCS | Mod: S$GLB,,, | Performed by: PHYSICIAN ASSISTANT

## 2019-08-16 PROCEDURE — 99999 PR PBB SHADOW E&M-EST. PATIENT-LVL III: CPT | Mod: PBBFAC,,, | Performed by: PHYSICIAN ASSISTANT

## 2019-08-16 PROCEDURE — 3008F PR BODY MASS INDEX (BMI) DOCUMENTED: ICD-10-PCS | Mod: CPTII,S$GLB,, | Performed by: PHYSICIAN ASSISTANT

## 2019-08-16 PROCEDURE — 99999 PR PBB SHADOW E&M-EST. PATIENT-LVL III: ICD-10-PCS | Mod: PBBFAC,,, | Performed by: PHYSICIAN ASSISTANT

## 2019-08-16 PROCEDURE — 3008F BODY MASS INDEX DOCD: CPT | Mod: CPTII,S$GLB,, | Performed by: PHYSICIAN ASSISTANT

## 2019-08-16 PROCEDURE — 87502 POCT INFLUENZA A/B MOLECULAR: ICD-10-PCS | Mod: QW,S$GLB,, | Performed by: PHYSICIAN ASSISTANT

## 2019-08-16 PROCEDURE — 99214 OFFICE O/P EST MOD 30 MIN: CPT | Mod: S$GLB,,, | Performed by: PHYSICIAN ASSISTANT

## 2019-08-16 RX ORDER — FLUTICASONE PROPIONATE 50 MCG
1 SPRAY, SUSPENSION (ML) NASAL DAILY
Qty: 1 BOTTLE | Refills: 0 | Status: SHIPPED | OUTPATIENT
Start: 2019-08-16 | End: 2022-07-14

## 2019-08-16 NOTE — PROGRESS NOTES
Saad Garvin is a 34 y.o. female who presents today with complaints of body aches, chills, fever, ear and throat pain, and decreased appetite.  She states it has been presents for 2 days.  She states her symptoms are slightly better today after taking Allegra last night.    Review of Systems   Constitutional: Positive for chills and fever.   HENT: Positive for congestion and sore throat.    Respiratory: Negative for cough and shortness of breath.    Cardiovascular: Negative for chest pain.   Gastrointestinal: Negative for abdominal pain and nausea.   Genitourinary: Negative for dysuria and frequency.   Musculoskeletal: Positive for myalgias. Negative for back pain.   Neurological: Negative for headaches.       Physical Exam:  Vitals:    08/16/19 0902   BP: 112/64   Pulse: 105   Resp: 18   Temp: 98.5 °F (36.9 °C)     Physical Exam   Constitutional: She is oriented to person, place, and time and well-developed, well-nourished, and in no distress.   HENT:   Head: Normocephalic.   Right Ear: External ear normal.   Left Ear: External ear normal.   Mouth/Throat: No oropharyngeal exudate.   Neck: Normal range of motion.   Cardiovascular: Normal rate and normal heart sounds.   Pulmonary/Chest: Effort normal and breath sounds normal. No respiratory distress.   Neurological: She is alert and oriented to person, place, and time.   Skin: Skin is warm.   Psychiatric: Affect normal.       Assessment:  Encounter Diagnoses   Name Primary?    Generalized body aches     Upper respiratory tract infection, unspecified type Yes       Plan:  Advise increase p.o. fluids--at least 64 ounces of water/juice & rest  Meds:  Flonase.  Normal saline nasal wash to irrigate sinuses and for congestion/runny nose.  Cool mist humidifier/vaporizer.  Practice good handwashing.  Mucinex for cough and chest congestion.  Tylenol or Ibuprofen for fever, headache and body aches.  Warm salt water gargles for throat comfort.  Chloraseptic spray or  lozenges for throat comfort.  See PCP or go to ER if symptoms worsen or fail to improve with treatment

## 2019-08-16 NOTE — PATIENT INSTRUCTIONS
Advise increase p.o. fluids--at least 64 ounces of water/juice & rest  Meds:  Flonase.  Normal saline nasal wash to irrigate sinuses and for congestion/runny nose.  Cool mist humidifier/vaporizer.  Practice good handwashing.  Mucinex for cough and chest congestion.  Tylenol or Ibuprofen for fever, headache and body aches.  Warm salt water gargles for throat comfort.  Chloraseptic spray or lozenges for throat comfort.  See PCP or go to ER if symptoms worsen or fail to improve with treatment

## 2019-10-02 ENCOUNTER — TELEPHONE (OUTPATIENT)
Dept: OPHTHALMOLOGY | Facility: CLINIC | Age: 34
End: 2019-10-02

## 2019-10-02 ENCOUNTER — OFFICE VISIT (OUTPATIENT)
Dept: OPHTHALMOLOGY | Facility: CLINIC | Age: 34
End: 2019-10-02
Payer: COMMERCIAL

## 2019-10-02 DIAGNOSIS — S05.01XA ABRASION OF RIGHT CORNEA, INITIAL ENCOUNTER: Primary | ICD-10-CM

## 2019-10-02 PROCEDURE — 92002 INTRM OPH EXAM NEW PATIENT: CPT | Mod: S$GLB,,, | Performed by: OPTOMETRIST

## 2019-10-02 PROCEDURE — 99999 PR PBB SHADOW E&M-EST. PATIENT-LVL II: ICD-10-PCS | Mod: PBBFAC,,, | Performed by: OPTOMETRIST

## 2019-10-02 PROCEDURE — 99999 PR PBB SHADOW E&M-EST. PATIENT-LVL II: CPT | Mod: PBBFAC,,, | Performed by: OPTOMETRIST

## 2019-10-02 PROCEDURE — 92002 PR EYE EXAM, NEW PATIENT,INTERMED: ICD-10-PCS | Mod: S$GLB,,, | Performed by: OPTOMETRIST

## 2019-10-02 RX ORDER — NEOMYCIN SULFATE, POLYMYXIN B SULFATE AND DEXAMETHASONE 3.5; 10000; 1 MG/ML; [USP'U]/ML; MG/ML
1 SUSPENSION/ DROPS OPHTHALMIC 4 TIMES DAILY
Qty: 5 ML | Refills: 0 | Status: SHIPPED | OUTPATIENT
Start: 2019-10-02 | End: 2019-10-06

## 2019-10-02 NOTE — TELEPHONE ENCOUNTER
----- Message from Randi Hernandez sent at 10/2/2019 10:22 AM CDT -----  Patient needs call back to schedule appointment..[phone] (home)
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
## 2020-02-25 ENCOUNTER — OFFICE VISIT (OUTPATIENT)
Dept: URGENT CARE | Facility: CLINIC | Age: 35
End: 2020-02-25
Payer: COMMERCIAL

## 2020-02-25 VITALS
HEART RATE: 84 BPM | SYSTOLIC BLOOD PRESSURE: 137 MMHG | BODY MASS INDEX: 30.36 KG/M2 | OXYGEN SATURATION: 100 % | DIASTOLIC BLOOD PRESSURE: 83 MMHG | WEIGHT: 205.56 LBS | TEMPERATURE: 97 F

## 2020-02-25 DIAGNOSIS — H60.501 ACUTE OTITIS EXTERNA OF RIGHT EAR, UNSPECIFIED TYPE: Primary | ICD-10-CM

## 2020-02-25 DIAGNOSIS — H92.01 OTALGIA OF RIGHT EAR: ICD-10-CM

## 2020-02-25 PROCEDURE — 99214 OFFICE O/P EST MOD 30 MIN: CPT | Mod: S$GLB,,, | Performed by: NURSE PRACTITIONER

## 2020-02-25 PROCEDURE — 99214 PR OFFICE/OUTPT VISIT, EST, LEVL IV, 30-39 MIN: ICD-10-PCS | Mod: S$GLB,,, | Performed by: NURSE PRACTITIONER

## 2020-02-25 RX ORDER — CEFDINIR 300 MG/1
300 CAPSULE ORAL 2 TIMES DAILY
Qty: 14 CAPSULE | Refills: 0 | Status: SHIPPED | OUTPATIENT
Start: 2020-02-25 | End: 2020-03-03

## 2020-02-25 NOTE — PROGRESS NOTES
Subjective:       Patient ID: Saad Garvin is a 34 y.o. female.    Vitals:  weight is 93.2 kg (205 lb 9.3 oz). Her temperature is 97.2 °F (36.2 °C). Her blood pressure is 137/83 and her pulse is 84. Her oxygen saturation is 100%.     Chief Complaint: Otalgia (right )    Patient state she began using Ciprodex last night leftover from a previous ear infection. States she did check the expiration date prior to use.    Otalgia    There is pain in the right ear. This is a new problem. The current episode started in the past 7 days (sunday ). The problem occurs constantly. The problem has been gradually improving (pain   drainage increasing ). There has been no fever. The pain is at a severity of 3/10. The pain is mild. Associated symptoms include ear discharge and headaches. Pertinent negatives include no abdominal pain, coughing, diarrhea, hearing loss, neck pain, rash, rhinorrhea, sore throat or vomiting. She has tried ear drops (ear drops ) for the symptoms. The treatment provided no relief. There is no history of a chronic ear infection, hearing loss or a tympanostomy tube.       Constitution: Negative for chills, sweating, fatigue and fever.   HENT: Positive for ear pain and ear discharge. Negative for hearing loss, congestion and sore throat.         Ear fullness    Neck: Negative for neck pain and painful lymph nodes.   Cardiovascular: Negative for chest pain and leg swelling.   Eyes: Negative for double vision and blurred vision.   Respiratory: Negative for cough and shortness of breath.    Gastrointestinal: Negative for abdominal pain, nausea, vomiting and diarrhea.   Genitourinary: Negative for dysuria, frequency, urgency and history of kidney stones.   Musculoskeletal: Negative for joint pain, joint swelling, muscle cramps and muscle ache.   Skin: Negative for color change, pale, rash and bruising.   Allergic/Immunologic: Negative for seasonal allergies.   Neurological: Positive for headaches. Negative  for dizziness, history of vertigo, light-headedness and passing out.   Hematologic/Lymphatic: Negative for swollen lymph nodes.   Psychiatric/Behavioral: Negative for nervous/anxious, sleep disturbance and depression. The patient is not nervous/anxious.        Objective:      Physical Exam   Constitutional: She is oriented to person, place, and time. She appears well-developed and well-nourished. She is cooperative.  Non-toxic appearance. She does not have a sickly appearance. She does not appear ill. No distress.   HENT:   Head: Normocephalic and atraumatic.   Right Ear: Hearing, external ear and ear canal normal. There is drainage and swelling. Tympanic membrane is erythematous.   Left Ear: Hearing, tympanic membrane and external ear normal.   Nose: Nose normal. No mucosal edema, rhinorrhea or nasal deformity. No epistaxis. Right sinus exhibits no maxillary sinus tenderness and no frontal sinus tenderness. Left sinus exhibits no maxillary sinus tenderness and no frontal sinus tenderness.   Mouth/Throat: Uvula is midline, oropharynx is clear and moist and mucous membranes are normal. No trismus in the jaw. Normal dentition. No uvula swelling. No oropharyngeal exudate, posterior oropharyngeal edema or posterior oropharyngeal erythema.   Excessive cerumen noted to left ear canal.   Eyes: Conjunctivae and lids are normal. No scleral icterus.   Neck: Trachea normal, full passive range of motion without pain and phonation normal. Neck supple. No neck rigidity. No edema and no erythema present.   Cardiovascular: Normal rate, regular rhythm, normal heart sounds, intact distal pulses and normal pulses.   Pulmonary/Chest: Effort normal and breath sounds normal. No respiratory distress. She has no decreased breath sounds. She has no rhonchi.   Abdominal: Normal appearance.   Musculoskeletal: Normal range of motion. She exhibits no edema or deformity.   Neurological: She is alert and oriented to person, place, and time. She  exhibits normal muscle tone. Coordination normal.   Skin: Skin is warm, dry, intact, not diaphoretic and not pale.   Psychiatric: She has a normal mood and affect. Her speech is normal and behavior is normal. Judgment and thought content normal. Cognition and memory are normal.   Nursing note and vitals reviewed.        Assessment:       1. Acute otitis externa of right ear, unspecified type    2. Otalgia of right ear        Plan:         Acute otitis externa of right ear, unspecified type    Otalgia of right ear    Other orders  -     cefdinir (OMNICEF) 300 MG capsule; Take 1 capsule (300 mg total) by mouth 2 (two) times daily. for 7 days  Dispense: 14 capsule; Refill: 0         Instill antibiotic drops twice a day for 7 days.  Take oral antibiotics as prescribed.  Avoid getting water into ear.  Take tylenol/ibuprofen for fever and pain.  Follow up with PCP or ENT if symptoms persist or worsen.

## 2020-02-25 NOTE — PATIENT INSTRUCTIONS
Instill antibiotic drops twice a day for 7 days.  Take oral antibiotics as prescribed.  Avoid getting water into ear.  Take tylenol/ibuprofen for fever and pain.  Follow up with PCP or ENT if symptoms persist or worsen.

## 2020-02-27 ENCOUNTER — TELEPHONE (OUTPATIENT)
Dept: URGENT CARE | Facility: CLINIC | Age: 35
End: 2020-02-27

## 2020-02-27 NOTE — TELEPHONE ENCOUNTER
Courtesy call performed. No answer. Left voicemail stating this was a courtesy call to check on patient.

## 2020-10-27 ENCOUNTER — OFFICE VISIT (OUTPATIENT)
Dept: URGENT CARE | Facility: CLINIC | Age: 35
End: 2020-10-27
Payer: COMMERCIAL

## 2020-10-27 VITALS
BODY MASS INDEX: 29.63 KG/M2 | HEART RATE: 80 BPM | TEMPERATURE: 97 F | SYSTOLIC BLOOD PRESSURE: 131 MMHG | DIASTOLIC BLOOD PRESSURE: 72 MMHG | HEIGHT: 70 IN | WEIGHT: 207 LBS

## 2020-10-27 DIAGNOSIS — B34.9 VIRAL SYNDROME: Primary | ICD-10-CM

## 2020-10-27 DIAGNOSIS — Z11.9 ENCOUNTER FOR SCREENING EXAMINATION FOR INFECTIOUS DISEASE: ICD-10-CM

## 2020-10-27 DIAGNOSIS — R11.0 NAUSEA: ICD-10-CM

## 2020-10-27 DIAGNOSIS — R19.7 DIARRHEA, UNSPECIFIED TYPE: ICD-10-CM

## 2020-10-27 LAB
CTP QC/QA: YES
CTP QC/QA: YES
FLUAV AG NPH QL: NEGATIVE
FLUBV AG NPH QL: NEGATIVE
SARS-COV-2 RDRP RESP QL NAA+PROBE: NEGATIVE

## 2020-10-27 PROCEDURE — U0002: ICD-10-PCS | Mod: QW,S$GLB,, | Performed by: PHYSICIAN ASSISTANT

## 2020-10-27 PROCEDURE — 87804 INFLUENZA ASSAY W/OPTIC: CPT | Mod: QW,S$GLB,, | Performed by: PHYSICIAN ASSISTANT

## 2020-10-27 PROCEDURE — U0002 COVID-19 LAB TEST NON-CDC: HCPCS | Mod: QW,S$GLB,, | Performed by: PHYSICIAN ASSISTANT

## 2020-10-27 PROCEDURE — 99214 OFFICE O/P EST MOD 30 MIN: CPT | Mod: 25,S$GLB,, | Performed by: PHYSICIAN ASSISTANT

## 2020-10-27 PROCEDURE — 87804 POCT INFLUENZA A/B: ICD-10-PCS | Mod: 59,QW,S$GLB, | Performed by: PHYSICIAN ASSISTANT

## 2020-10-27 PROCEDURE — 99214 PR OFFICE/OUTPT VISIT, EST, LEVL IV, 30-39 MIN: ICD-10-PCS | Mod: 25,S$GLB,, | Performed by: PHYSICIAN ASSISTANT

## 2020-10-27 RX ORDER — DICYCLOMINE HYDROCHLORIDE 10 MG/1
20 CAPSULE ORAL
Qty: 40 CAPSULE | Refills: 0 | Status: SHIPPED | OUTPATIENT
Start: 2020-10-27 | End: 2020-11-01

## 2020-10-27 NOTE — LETTER
[address] [facility] [address] ? [place], [address] ? Phone [phone] ? Fax             Return to Work/School    Patient: Saad Garvin  YOB: 1985   Date: 10/27/2020      To Whom It May Concern:     Saad Garvin was in contact with/seen in my office on 10/27/2020. COVID-19 is present in our communities across the state. Not all patients are eligible or appropriate to be tested. In this situation, your employee meets the following criteria:     Saad Garvin has met the criteria for COVID-19 testing and has a NEGATIVE result. The employee can return to work once they are asymptomatic for 24 hours without the use of fever reducing medications (Tylenol, Motrin, etc).     If you have any questions or concerns, or if I can be of further assistance, please do not hesitate to contact me.     Sincerely,    Carri Keller PA-C

## 2020-10-27 NOTE — PROGRESS NOTES
"Subjective:       Patient ID: Saad Garvin is a 35 y.o. female.    Vitals:  height is 5' 9.5" (1.765 m) and weight is 93.9 kg (207 lb). Her temporal temperature is 97.4 °F (36.3 °C). Her blood pressure is 131/72 and her pulse is 80.     Chief Complaint: COVID-19 Concerns    Patient is 35-year-old female who presents to clinic with diarrhea, nausea, fatigue and headaches.  Patient states that 3 days ago symptoms started with some abdominal cramping, diarrhea, nausea.  Patient thought she might have food poisoning and started taking Imodium.  She states that diarrhea has somewhat improved however nausea was not improved even despite taking Zofran 8 mg.  Today patient began to experience fatigue, chills, and headache.  Patient like to be tested for COVID.  Patient denies any fever, vomiting, blood in stool.     URI   This is a new problem. The current episode started today. The problem has been gradually worsening. There has been no fever. Associated symptoms include abdominal pain, diarrhea, headaches, nausea, sinus pain and sneezing. Pertinent negatives include no chest pain, congestion, coughing, dysuria, ear pain, joint pain, joint swelling, neck pain, plugged ear sensation, rash, rhinorrhea, sore throat, swollen glands, vomiting or wheezing. Treatments tried: zofran, Advil, Imodium. The treatment provided mild relief.       Constitution: Positive for appetite change, chills, sweating and fatigue. Negative for fever.   HENT: Positive for postnasal drip, sinus pain and sinus pressure. Negative for ear pain, congestion, sore throat and trouble swallowing.    Neck: Negative for neck pain.   Cardiovascular: Negative for chest pain.   Eyes: Negative.    Respiratory: Negative for cough, shortness of breath and wheezing.    Gastrointestinal: Positive for abdominal pain, nausea and diarrhea. Negative for abdominal trauma, abdominal bloating, history of abdominal surgery, vomiting, constipation, dark colored stools " and heartburn.   Endocrine: negative.   Genitourinary: Negative for dysuria, missed menses and pelvic pain.   Musculoskeletal: Negative for back pain.   Skin: Negative for rash.   Allergic/Immunologic: Positive for sneezing.   Neurological: Positive for headaches.   Hematologic/Lymphatic: Negative.    Psychiatric/Behavioral: Negative.        Objective:      Physical Exam   Constitutional: She is oriented to person, place, and time. She appears well-developed. She is cooperative.  Non-toxic appearance. She does not appear ill. No distress.   HENT:   Head: Normocephalic and atraumatic.   Ears:   Right Ear: Hearing, tympanic membrane, external ear and ear canal normal.   Left Ear: Hearing, tympanic membrane, external ear and ear canal normal.   Nose: Nose normal. No mucosal edema, rhinorrhea or nasal deformity. No epistaxis. Right sinus exhibits no maxillary sinus tenderness and no frontal sinus tenderness. Left sinus exhibits no maxillary sinus tenderness and no frontal sinus tenderness.   Mouth/Throat: Uvula is midline, oropharynx is clear and moist and mucous membranes are normal. No trismus in the jaw. Normal dentition. No uvula swelling. No oropharyngeal exudate, posterior oropharyngeal edema or posterior oropharyngeal erythema.   Eyes: Conjunctivae and lids are normal. No scleral icterus.   Neck: Trachea normal, full passive range of motion without pain and phonation normal. Neck supple. No neck rigidity. No edema and no erythema present.   Cardiovascular: Normal rate, regular rhythm, normal heart sounds and normal pulses.   Pulmonary/Chest: Effort normal and breath sounds normal. No respiratory distress. She has no decreased breath sounds. She has no rhonchi.   Abdominal: Soft. Normal appearance. Bowel sounds are increased. There is no abdominal tenderness. flat abdomen  Musculoskeletal: Normal range of motion.         General: No deformity.   Neurological: She is alert and oriented to person, place, and time.  She exhibits normal muscle tone. Coordination normal.   Skin: Skin is warm, dry, intact, not diaphoretic and not pale. Psychiatric: Her speech is normal and behavior is normal. Judgment and thought content normal.   Nursing note and vitals reviewed.    Results for orders placed or performed in visit on 10/27/20   POCT COVID-19 Rapid Screening   Result Value Ref Range    POC Rapid COVID Negative Negative     Acceptable Yes    POCT Influenza A/B   Result Value Ref Range    Rapid Influenza A Ag Negative Negative    Rapid Influenza B Ag Negative Negative     Acceptable Yes            Assessment:       1. Viral syndrome    2. Encounter for screening examination for infectious disease    3. Diarrhea, unspecified type    4. Nausea        Plan:       VSS. Flu & COVID testing negative. Discussed the importance of adequate hydration. Alternate between water and an electrolyte replacement beverage (pedialyte, pedialyte popsicles). Eat a bland diet as tolerated. Avoid heavily seasoned, spicy, or fatty foods. Offered pt promethazine prn for nausea, however she says she is unable to tolerate this medication. Continue Zofran prn. Monitory symptoms closely. Follow up with pcp if symptoms don't improve or for diarrhea that persists > 7 days. Strict ER precautions discussed such as severe abdominal pain, inability to tolerate liquids, fever, or pain in RLQ, and pt voiced understanding.       Viral syndrome  -     dicyclomine (BENTYL) 10 MG capsule; Take 2 capsules (20 mg total) by mouth 4 (four) times daily before meals and nightly. for 5 days  Dispense: 40 capsule; Refill: 0    Encounter for screening examination for infectious disease  -     POCT COVID-19 Rapid Screening    Diarrhea, unspecified type  -     POCT Influenza A/B  -     dicyclomine (BENTYL) 10 MG capsule; Take 2 capsules (20 mg total) by mouth 4 (four) times daily before meals and nightly. for 5 days  Dispense: 40 capsule; Refill:  "0    Nausea  -     POCT Influenza A/B      Patient Instructions     Viral Syndrome (Adult)  A viral illness may cause a number of symptoms. The symptoms depend on the part of the body that the virus affects. If it settles in your nose, throat, and lungs, it may cause cough, sore throat, congestion, and sometimes headache. If it settles in your stomach and intestinal tract, it may cause vomiting and diarrhea. Sometimes it causes vague symptoms like "aching all over," feeling tired, loss of appetite, or fever.  A viral illness usually lasts 1 to 2 weeks, but sometimes it lasts longer. In some cases, a more serious infection can look like a viral syndrome in the first few days of the illness. You may need another exam and additional tests to know the difference. Watch for the warning signs listed below.  Home care  Follow these guidelines for taking care of yourself at home:  · If symptoms are severe, rest at home for the first 2 to 3 days.  · Stay away from cigarette smoke - both your smoke and the smoke from others.  · You may use over-the-counter acetaminophen or ibuprofen for fever, muscle aching, and headache, unless another medicine was prescribed for this. If you have chronic liver or kidney disease or ever had a stomach ulcer or GI bleeding, talk with your doctor before using these medicines. No one who is younger than 18 and ill with a fever should take aspirin. It may cause severe disease or death.  · Your appetite may be poor, so a light diet is fine. Avoid dehydration by drinking 8 to 12 8-ounce glasses of fluids each day. This may include water; orange juice; lemonade; apple, grape, and cranberry juice; clear fruit drinks; electrolyte replacement and sports drinks; and decaffeinated teas and coffee. If you have been diagnosed with a kidney disease, ask your doctor how much and what types of fluids you should drink to prevent dehydration. If you have kidney disease, drinking too much fluid can cause it " build up in the your body and be dangerous to your health.  · Over-the-counter remedies won't shorten the length of the illness but may be helpful for cough, sore throat; and nasal and sinus congestion. Don't use decongestants if you have high blood pressure.  Follow-up care  Follow up with your healthcare provider if you do not improve over the next week.  Call 911  Get emergency medical care if any of the following occur:  · Convulsion  · Feeling weak, dizzy, or like you are going to faint  · Chest pain, shortness of breath, wheezing, or difficulty breathing  When to seek medical advice  Call your healthcare provider right away if any of these occur:  · Cough with lots of colored sputum (mucus) or blood in your sputum  · Chest pain, shortness of breath, wheezing, or difficulty breathing  · Severe headache; face, neck, or ear pain  · Severe, constant pain in the lower right side of your belly (abdominal)  · Continued vomiting (cant keep liquids down)  · Frequent diarrhea (more than 5 times a day); blood (red or black color) or mucus in diarrhea  · Feeling weak, dizzy, or like you are going to faint  · Extreme thirst  · Fever of 100.4°F (38°C) or higher, or as directed by your healthcare provider  Date Last Reviewed: 9/25/2015  © 5256-1580 [employer]. [address] [place] [address], [place] [address]. All rights reserved. This information is not intended as a substitute for professional medical care. Always follow your healthcare professional's instructions.

## 2020-10-27 NOTE — PATIENT INSTRUCTIONS
"  Viral Syndrome (Adult)  A viral illness may cause a number of symptoms. The symptoms depend on the part of the body that the virus affects. If it settles in your nose, throat, and lungs, it may cause cough, sore throat, congestion, and sometimes headache. If it settles in your stomach and intestinal tract, it may cause vomiting and diarrhea. Sometimes it causes vague symptoms like "aching all over," feeling tired, loss of appetite, or fever.  A viral illness usually lasts 1 to 2 weeks, but sometimes it lasts longer. In some cases, a more serious infection can look like a viral syndrome in the first few days of the illness. You may need another exam and additional tests to know the difference. Watch for the warning signs listed below.  Home care  Follow these guidelines for taking care of yourself at home:  · If symptoms are severe, rest at home for the first 2 to 3 days.  · Stay away from cigarette smoke - both your smoke and the smoke from others.  · You may use over-the-counter acetaminophen or ibuprofen for fever, muscle aching, and headache, unless another medicine was prescribed for this. If you have chronic liver or kidney disease or ever had a stomach ulcer or GI bleeding, talk with your doctor before using these medicines. No one who is younger than 18 and ill with a fever should take aspirin. It may cause severe disease or death.  · Your appetite may be poor, so a light diet is fine. Avoid dehydration by drinking 8 to 12 8-ounce glasses of fluids each day. This may include water; orange juice; lemonade; apple, grape, and cranberry juice; clear fruit drinks; electrolyte replacement and sports drinks; and decaffeinated teas and coffee. If you have been diagnosed with a kidney disease, ask your doctor how much and what types of fluids you should drink to prevent dehydration. If you have kidney disease, drinking too much fluid can cause it build up in the your body and be dangerous to your " health.  · Over-the-counter remedies won't shorten the length of the illness but may be helpful for cough, sore throat; and nasal and sinus congestion. Don't use decongestants if you have high blood pressure.  Follow-up care  Follow up with your healthcare provider if you do not improve over the next week.  Call 911  Get emergency medical care if any of the following occur:  · Convulsion  · Feeling weak, dizzy, or like you are going to faint  · Chest pain, shortness of breath, wheezing, or difficulty breathing  When to seek medical advice  Call your healthcare provider right away if any of these occur:  · Cough with lots of colored sputum (mucus) or blood in your sputum  · Chest pain, shortness of breath, wheezing, or difficulty breathing  · Severe headache; face, neck, or ear pain  · Severe, constant pain in the lower right side of your belly (abdominal)  · Continued vomiting (cant keep liquids down)  · Frequent diarrhea (more than 5 times a day); blood (red or black color) or mucus in diarrhea  · Feeling weak, dizzy, or like you are going to faint  · Extreme thirst  · Fever of 100.4°F (38°C) or higher, or as directed by your healthcare provider  Date Last Reviewed: 9/25/2015  © 7430-3181 [employer]. [address] [place] [address], [place] [address]. All rights reserved. This information is not intended as a substitute for professional medical care. Always follow your healthcare professional's instructions.

## 2020-10-30 ENCOUNTER — TELEPHONE (OUTPATIENT)
Dept: URGENT CARE | Facility: CLINIC | Age: 35
End: 2020-10-30

## 2020-12-06 NOTE — PATIENT INSTRUCTIONS
flonase   mucinex   Take antibiotic as prescribed   Steroid in am   Increase fluids   Return for any new or worsening symptoms    MED/SURG

## 2021-03-24 ENCOUNTER — OFFICE VISIT (OUTPATIENT)
Dept: URGENT CARE | Facility: CLINIC | Age: 36
End: 2021-03-24

## 2021-03-24 VITALS
TEMPERATURE: 98 F | OXYGEN SATURATION: 98 % | SYSTOLIC BLOOD PRESSURE: 131 MMHG | BODY MASS INDEX: 30.66 KG/M2 | WEIGHT: 207 LBS | DIASTOLIC BLOOD PRESSURE: 90 MMHG | HEIGHT: 69 IN | RESPIRATION RATE: 20 BRPM | HEART RATE: 101 BPM

## 2021-03-24 DIAGNOSIS — Z11.9 ENCOUNTER FOR SCREENING EXAMINATION FOR INFECTIOUS DISEASE: Primary | ICD-10-CM

## 2021-03-24 DIAGNOSIS — J06.9 URTI (ACUTE UPPER RESPIRATORY INFECTION): ICD-10-CM

## 2021-03-24 LAB
CTP QC/QA: YES
SARS-COV-2 RDRP RESP QL NAA+PROBE: NEGATIVE

## 2021-03-24 PROCEDURE — U0002: ICD-10-PCS | Mod: QW,TIER,S$GLB, | Performed by: PHYSICIAN ASSISTANT

## 2021-03-24 PROCEDURE — 99203 OFFICE O/P NEW LOW 30 MIN: CPT | Mod: TIER,S$GLB,, | Performed by: PHYSICIAN ASSISTANT

## 2021-03-24 PROCEDURE — 99203 PR OFFICE/OUTPT VISIT, NEW, LEVL III, 30-44 MIN: ICD-10-PCS | Mod: TIER,S$GLB,, | Performed by: PHYSICIAN ASSISTANT

## 2021-03-24 PROCEDURE — U0002 COVID-19 LAB TEST NON-CDC: HCPCS | Mod: QW,TIER,S$GLB, | Performed by: PHYSICIAN ASSISTANT

## 2021-03-27 ENCOUNTER — TELEPHONE (OUTPATIENT)
Dept: URGENT CARE | Facility: CLINIC | Age: 36
End: 2021-03-27

## 2021-04-29 ENCOUNTER — PATIENT MESSAGE (OUTPATIENT)
Dept: RESEARCH | Facility: HOSPITAL | Age: 36
End: 2021-04-29

## 2021-08-08 ENCOUNTER — PATIENT MESSAGE (OUTPATIENT)
Dept: ADMINISTRATIVE | Facility: OTHER | Age: 36
End: 2021-08-08

## 2021-08-08 ENCOUNTER — PATIENT MESSAGE (OUTPATIENT)
Dept: ADMINISTRATIVE | Facility: CLINIC | Age: 36
End: 2021-08-08

## 2021-08-08 ENCOUNTER — OFFICE VISIT (OUTPATIENT)
Dept: URGENT CARE | Facility: CLINIC | Age: 36
End: 2021-08-08

## 2021-08-08 VITALS
RESPIRATION RATE: 14 BRPM | BODY MASS INDEX: 30.36 KG/M2 | SYSTOLIC BLOOD PRESSURE: 126 MMHG | WEIGHT: 205 LBS | DIASTOLIC BLOOD PRESSURE: 87 MMHG | OXYGEN SATURATION: 100 % | HEIGHT: 69 IN | TEMPERATURE: 98 F | HEART RATE: 98 BPM

## 2021-08-08 DIAGNOSIS — R53.83 FATIGUE, UNSPECIFIED TYPE: ICD-10-CM

## 2021-08-08 DIAGNOSIS — U07.1 COVID-19: Primary | ICD-10-CM

## 2021-08-08 LAB
CTP QC/QA: YES
SARS-COV-2 RDRP RESP QL NAA+PROBE: POSITIVE

## 2021-08-08 PROCEDURE — U0002 COVID-19 LAB TEST NON-CDC: HCPCS | Mod: QW,TIER,S$GLB, | Performed by: EMERGENCY MEDICINE

## 2021-08-08 PROCEDURE — 99203 PR OFFICE/OUTPT VISIT, NEW, LEVL III, 30-44 MIN: ICD-10-PCS | Mod: TIER,S$GLB,, | Performed by: EMERGENCY MEDICINE

## 2021-08-08 PROCEDURE — 99203 OFFICE O/P NEW LOW 30 MIN: CPT | Mod: TIER,S$GLB,, | Performed by: EMERGENCY MEDICINE

## 2021-08-08 PROCEDURE — U0002: ICD-10-PCS | Mod: QW,TIER,S$GLB, | Performed by: EMERGENCY MEDICINE

## 2021-08-09 ENCOUNTER — PATIENT MESSAGE (OUTPATIENT)
Dept: ADMINISTRATIVE | Facility: CLINIC | Age: 36
End: 2021-08-09

## 2021-08-09 ENCOUNTER — PATIENT MESSAGE (OUTPATIENT)
Dept: ADMINISTRATIVE | Facility: OTHER | Age: 36
End: 2021-08-09

## 2021-08-10 ENCOUNTER — PATIENT MESSAGE (OUTPATIENT)
Dept: ADMINISTRATIVE | Facility: CLINIC | Age: 36
End: 2021-08-10

## 2021-08-11 ENCOUNTER — NURSE TRIAGE (OUTPATIENT)
Dept: ADMINISTRATIVE | Facility: CLINIC | Age: 36
End: 2021-08-11

## 2021-08-15 ENCOUNTER — PATIENT MESSAGE (OUTPATIENT)
Dept: INFECTIOUS DISEASES | Facility: HOSPITAL | Age: 36
End: 2021-08-15

## 2021-08-18 ENCOUNTER — LAB VISIT (OUTPATIENT)
Dept: PRIMARY CARE CLINIC | Facility: OTHER | Age: 36
End: 2021-08-18
Payer: OTHER GOVERNMENT

## 2021-08-18 DIAGNOSIS — Z20.822 ENCOUNTER FOR LABORATORY TESTING FOR COVID-19 VIRUS: ICD-10-CM

## 2021-08-18 PROCEDURE — U0003 INFECTIOUS AGENT DETECTION BY NUCLEIC ACID (DNA OR RNA); SEVERE ACUTE RESPIRATORY SYNDROME CORONAVIRUS 2 (SARS-COV-2) (CORONAVIRUS DISEASE [COVID-19]), AMPLIFIED PROBE TECHNIQUE, MAKING USE OF HIGH THROUGHPUT TECHNOLOGIES AS DESCRIBED BY CMS-2020-01-R: HCPCS

## 2021-08-20 LAB
SARS-COV-2 RNA RESP QL NAA+PROBE: DETECTED
TEST PERFORMANCE INFO SPEC: ABNORMAL

## 2021-08-26 ENCOUNTER — LAB VISIT (OUTPATIENT)
Dept: PRIMARY CARE CLINIC | Facility: OTHER | Age: 36
End: 2021-08-26
Attending: INTERNAL MEDICINE
Payer: OTHER GOVERNMENT

## 2021-08-26 DIAGNOSIS — R05.9 COUGH: ICD-10-CM

## 2021-08-26 PROCEDURE — U0003 INFECTIOUS AGENT DETECTION BY NUCLEIC ACID (DNA OR RNA); SEVERE ACUTE RESPIRATORY SYNDROME CORONAVIRUS 2 (SARS-COV-2) (CORONAVIRUS DISEASE [COVID-19]), AMPLIFIED PROBE TECHNIQUE, MAKING USE OF HIGH THROUGHPUT TECHNOLOGIES AS DESCRIBED BY CMS-2020-01-R: HCPCS | Performed by: INTERNAL MEDICINE

## 2021-08-29 LAB — SARS-COV-2 RNA RESP QL NAA+PROBE: DETECTED

## 2022-02-23 ENCOUNTER — PATIENT MESSAGE (OUTPATIENT)
Dept: RESEARCH | Facility: HOSPITAL | Age: 37
End: 2022-02-23

## 2022-08-05 ENCOUNTER — PATIENT MESSAGE (OUTPATIENT)
Dept: OBSTETRICS AND GYNECOLOGY | Facility: CLINIC | Age: 37
End: 2022-08-05

## 2022-08-05 ENCOUNTER — TELEPHONE (OUTPATIENT)
Dept: OBSTETRICS AND GYNECOLOGY | Facility: CLINIC | Age: 37
End: 2022-08-05

## 2022-08-05 DIAGNOSIS — D25.1 FIBROIDS, INTRAMURAL: Primary | ICD-10-CM

## 2022-08-05 NOTE — TELEPHONE ENCOUNTER
----- Message from Liliana Vizcarra MD sent at 8/5/2022  9:38 AM CDT -----  Regarding: fibroids  Massimo chavez,   I have a pt w large fibroids, had a successful pregnancy w vag delivery 5 year sago w these fibroids present. We were planning abdominal myomectomy prior to her pregnancy but she got pregnant untintentionally.  She is not ready to try to conceive but doesn't want to go through an open procedure. Can you just look at her u/s and see if you think this is worth a consult with you for a less invasive procedure?  Thx!

## 2022-08-05 NOTE — TELEPHONE ENCOUNTER
Hey! It does look like she may be a candidate for a robotic myomectomy. We will get her set up for an MRI and a consult. Thanks!    @Jimbo, please schedule. Orders in

## 2022-08-09 ENCOUNTER — TELEPHONE (OUTPATIENT)
Dept: ADMINISTRATIVE | Facility: OTHER | Age: 37
End: 2022-08-09

## 2023-09-05 ENCOUNTER — OFFICE VISIT (OUTPATIENT)
Dept: URGENT CARE | Facility: CLINIC | Age: 38
End: 2023-09-05
Payer: COMMERCIAL

## 2023-09-05 VITALS
SYSTOLIC BLOOD PRESSURE: 138 MMHG | HEIGHT: 68 IN | BODY MASS INDEX: 33.75 KG/M2 | TEMPERATURE: 99 F | DIASTOLIC BLOOD PRESSURE: 77 MMHG | RESPIRATION RATE: 18 BRPM | WEIGHT: 222.69 LBS | HEART RATE: 102 BPM | OXYGEN SATURATION: 97 %

## 2023-09-05 DIAGNOSIS — R51.9 NONINTRACTABLE HEADACHE, UNSPECIFIED CHRONICITY PATTERN, UNSPECIFIED HEADACHE TYPE: ICD-10-CM
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
## 2023-09-05 PROCEDURE — 99213 PR OFFICE/OUTPT VISIT, EST, LEVL III, 20-29 MIN: ICD-10-PCS | Mod: 25,S$GLB,, | Performed by: NURSE PRACTITIONER

## 2023-09-05 PROCEDURE — 96372 PR INJECTION,THERAP/PROPH/DIAG2ST, IM OR SUBCUT: ICD-10-PCS | Mod: S$GLB,,, | Performed by: NURSE PRACTITIONER

## 2023-09-05 PROCEDURE — 96372 THER/PROPH/DIAG INJ SC/IM: CPT | Mod: S$GLB,,, | Performed by: NURSE PRACTITIONER

## 2023-09-05 PROCEDURE — 99213 OFFICE O/P EST LOW 20 MIN: CPT | Mod: 25,S$GLB,, | Performed by: NURSE PRACTITIONER

## 2023-09-05 RX ORDER — KETOROLAC TROMETHAMINE 30 MG/ML
30 INJECTION, SOLUTION INTRAMUSCULAR; INTRAVENOUS
Status: COMPLETED | OUTPATIENT
Start: 2023-09-05 | End: 2023-09-05

## 2023-09-05 RX ADMIN — KETOROLAC TROMETHAMINE 30 MG: 30 INJECTION, SOLUTION INTRAMUSCULAR; INTRAVENOUS at 05:09
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
## 2023-09-08 ENCOUNTER — TELEPHONE (OUTPATIENT)
Dept: URGENT CARE | Facility: CLINIC | Age: 38
End: 2023-09-08
Payer: COMMERCIAL

## 2023-09-08 NOTE — TELEPHONE ENCOUNTER
Called pt to see how she was feeling. She stated that she has a little soreness but other than that she is fine.

## 2023-11-14 ENCOUNTER — TELEPHONE (OUTPATIENT)
Dept: OBSTETRICS AND GYNECOLOGY | Facility: CLINIC | Age: 38
End: 2023-11-14
Payer: COMMERCIAL

## 2023-11-14 NOTE — TELEPHONE ENCOUNTER
Spoke with pt.    - Pt had updated referral for an mri sent over to see if she would be a candidate for myomectomy     -I told pt that I did not see a referral come in yet but I would forward this message over to  for her review to see how she would like to proceed.       Did you receive a referral for this pt? Are you able to place an order for an MRI before scheduling her?     Please advise

## 2023-11-14 NOTE — TELEPHONE ENCOUNTER
----- Message from Flora Lisa sent at 11/14/2023  1:45 PM CST -----  Contact: pt  Type:  Patient Returning Call    Who Called:pt   Who Left Message for Patient:Wagner   Does the patient know what this is regarding?:yes   Would the patient rather a call back or a response via [facility]? Call   Best Call Back Number: [phone]  Additional Information:     Pt stated a message in her portal can be sent if no answer

## 2023-11-14 NOTE — TELEPHONE ENCOUNTER
----- Message from Angélica Mtz sent at 11/10/2023 11:19 AM CST -----  Contact: Shari  Type:  Needs Medical Advice    Who Called: Shari wayne/ Dr. Liliana Vizcarra office  Would the patient rather a call back or a response via [facility]? call  Best Call Back Number: [phone]  Additional Information:   Shari requesting a call regarding referral/ mri that was sent to office last year  Shari want to know can pt can use that to be seen or do office needs to send over a new one ?

## 2023-11-27 ENCOUNTER — TELEPHONE (OUTPATIENT)
Dept: OBSTETRICS AND GYNECOLOGY | Facility: CLINIC | Age: 38
End: 2023-11-27
Payer: COMMERCIAL

## 2023-11-27 NOTE — TELEPHONE ENCOUNTER
Spoke with Bertha Vizcarra's office. They faxed another referral over. There is also a referral from 07/14/22 in her chart. She has been in for visit with them recently but not regarding what the referral is about.

## 2023-12-11 ENCOUNTER — TELEPHONE (OUTPATIENT)
Dept: OBSTETRICS AND GYNECOLOGY | Facility: CLINIC | Age: 38
End: 2023-12-11
Payer: COMMERCIAL

## 2023-12-12 ENCOUNTER — TELEPHONE (OUTPATIENT)
Dept: OBSTETRICS AND GYNECOLOGY | Facility: CLINIC | Age: 38
End: 2023-12-12
Payer: COMMERCIAL

## 2023-12-12 DIAGNOSIS — D25.1 FIBROIDS, INTRAMURAL: Primary | ICD-10-CM

## 2023-12-12 NOTE — TELEPHONE ENCOUNTER
Spoke with pt tp get her scheduled for consult on 2/22 at 9:30am.     Pt is wondering if she will need an MRI before her appt.

## 2023-12-12 NOTE — TELEPHONE ENCOUNTER
----- Message from Patricia Galaviz sent at 12/11/2023 11:26 AM CST -----  Regarding: Patient Returning Call  Contact: [phone]  Type:  Patient Returning Call    Who Called: pt   Who Left Message for Patient: Wagner Randhawa MA  Does the patient know what this is regarding?: appt   Would the patient rather a call back or a response via [facility]?  Call   Best Call Back Number: [phone]  Additional Information:

## 2023-12-13 ENCOUNTER — TELEPHONE (OUTPATIENT)
Dept: OBSTETRICS AND GYNECOLOGY | Facility: CLINIC | Age: 38
End: 2023-12-13
Payer: COMMERCIAL

## 2023-12-13 NOTE — TELEPHONE ENCOUNTER
----- Message from Sofía Sharma sent at 12/13/2023  8:06 AM CST -----  Type:  Needs Medical Advice    Who Called: Pt   Would the patient rather a call back or a response via [facility]?  Call   Best Call Back Number: [phone]  Additional Information: Pt would like sooner date option for appointment if it's virtual, pt is currently scheduled 02/22/24 for consult visit.

## 2023-12-13 NOTE — TELEPHONE ENCOUNTER
Pt would like to get the first offered appt of 2/8/24 at 2 pm for her consult. She wants to know if she can be seen virtually on that day if possible.    Please advise

## 2023-12-18 ENCOUNTER — TELEPHONE (OUTPATIENT)
Dept: OBSTETRICS AND GYNECOLOGY | Facility: CLINIC | Age: 38
End: 2023-12-18
Payer: COMMERCIAL

## 2023-12-18 ENCOUNTER — HOSPITAL ENCOUNTER (OUTPATIENT)
Dept: RADIOLOGY | Facility: HOSPITAL | Age: 38
Discharge: HOME | End: 2023-12-18
Attending: OBSTETRICS & GYNECOLOGY
Payer: COMMERCIAL

## 2023-12-18 DIAGNOSIS — D25.1 FIBROIDS, INTRAMURAL: ICD-10-CM

## 2023-12-18 PROCEDURE — 72197 MRI FEMALE PELVIS W W/O CONTRAST: ICD-10-PCS | Mod: 26,,, | Performed by: RADIOLOGY

## 2023-12-18 PROCEDURE — 72197 MRI PELVIS W/O & W/DYE: CPT | Mod: TC,PN

## 2023-12-18 PROCEDURE — A9585 GADOBUTROL INJECTION: HCPCS | Mod: PN | Performed by: OBSTETRICS & GYNECOLOGY

## 2023-12-18 PROCEDURE — 25500020 PHARM REV CODE 255: Mod: PN | Performed by: OBSTETRICS & GYNECOLOGY

## 2023-12-18 PROCEDURE — 72197 MRI PELVIS W/O & W/DYE: CPT | Mod: 26,,, | Performed by: RADIOLOGY

## 2023-12-18 RX ORDER — GADOBUTROL 604.72 MG/ML
10 INJECTION INTRAVENOUS
Status: COMPLETED | OUTPATIENT
Start: 2023-12-18 | End: 2023-12-18

## 2023-12-18 RX ADMIN — GADOBUTROL 10 ML: 604.72 INJECTION INTRAVENOUS at 09:12

## 2023-12-27 ENCOUNTER — OFFICE VISIT (OUTPATIENT)
Dept: URGENT CARE | Facility: CLINIC | Age: 38
End: 2023-12-27
Payer: COMMERCIAL

## 2023-12-27 VITALS
HEART RATE: 87 BPM | WEIGHT: 222 LBS | OXYGEN SATURATION: 97 % | DIASTOLIC BLOOD PRESSURE: 65 MMHG | TEMPERATURE: 98 F | HEIGHT: 67 IN | BODY MASS INDEX: 34.84 KG/M2 | SYSTOLIC BLOOD PRESSURE: 109 MMHG | RESPIRATION RATE: 20 BRPM

## 2023-12-27 DIAGNOSIS — J02.9 SORE THROAT: ICD-10-CM

## 2023-12-27 DIAGNOSIS — R06.2 WHEEZING: ICD-10-CM

## 2023-12-27 DIAGNOSIS — J06.9 URI WITH COUGH AND CONGESTION: Primary | ICD-10-CM

## 2023-12-27 DIAGNOSIS — R50.9 FEVER, UNSPECIFIED FEVER CAUSE: ICD-10-CM

## 2023-12-27 LAB
CTP QC/QA: YES
MOLECULAR STREP A: NEGATIVE
POC MOLECULAR INFLUENZA A AGN: NEGATIVE
POC MOLECULAR INFLUENZA B AGN: NEGATIVE
SARS-COV-2 AG RESP QL IA.RAPID: NEGATIVE

## 2023-12-27 PROCEDURE — 87811 SARS CORONAVIRUS 2 ANTIGEN POCT, MANUAL READ: ICD-10-PCS | Mod: QW,S$GLB,, | Performed by: NURSE PRACTITIONER

## 2023-12-27 PROCEDURE — 87502 POCT INFLUENZA A/B MOLECULAR: ICD-10-PCS | Mod: QW,S$GLB,, | Performed by: NURSE PRACTITIONER

## 2023-12-27 PROCEDURE — 87651 POCT STREP A MOLECULAR: ICD-10-PCS | Mod: QW,S$GLB,, | Performed by: NURSE PRACTITIONER

## 2023-12-27 PROCEDURE — 99214 OFFICE O/P EST MOD 30 MIN: CPT | Mod: S$GLB,,, | Performed by: NURSE PRACTITIONER

## 2023-12-27 PROCEDURE — 87502 INFLUENZA DNA AMP PROBE: CPT | Mod: QW,S$GLB,, | Performed by: NURSE PRACTITIONER

## 2023-12-27 PROCEDURE — 87811 SARS-COV-2 COVID19 W/OPTIC: CPT | Mod: QW,S$GLB,, | Performed by: NURSE PRACTITIONER

## 2023-12-27 PROCEDURE — 87651 STREP A DNA AMP PROBE: CPT | Mod: QW,S$GLB,, | Performed by: NURSE PRACTITIONER

## 2023-12-27 PROCEDURE — 99214 PR OFFICE/OUTPT VISIT, EST, LEVL IV, 30-39 MIN: ICD-10-PCS | Mod: S$GLB,,, | Performed by: NURSE PRACTITIONER

## 2023-12-27 RX ORDER — ALBUTEROL SULFATE 90 UG/1
2 AEROSOL, METERED RESPIRATORY (INHALATION) EVERY 6 HOURS PRN
Qty: 18 G | Refills: 1 | Status: SHIPPED | OUTPATIENT
Start: 2023-12-27 | End: 2024-02-22

## 2023-12-27 RX ORDER — PROMETHAZINE HYDROCHLORIDE AND DEXTROMETHORPHAN HYDROBROMIDE 6.25; 15 MG/5ML; MG/5ML
5 SYRUP ORAL EVERY 6 HOURS PRN
Qty: 180 ML | Refills: 0 | Status: SHIPPED | OUTPATIENT
Start: 2023-12-27 | End: 2024-02-22

## 2023-12-27 NOTE — PATIENT INSTRUCTIONS
Increase fluids Get plenty of rest.   General infection control measures--cover mouth with sneezing coughing, wash hands frequently   Rest activity ad belle   Tylenol or advil per package directions for fever body aches   Albuterol inhaler every 4-6 hrs prn cough   Supportive care measures   Warm salt water gargles for throat comfort    Viral infections usually resolve in 7-10 days; If symptoms persist or worsen follow up [facility] or PCP    Repeat Covid testing as needed

## 2023-12-27 NOTE — PROGRESS NOTES
"Subjective:      Patient ID: Saad Ayon is a 38 y.o. female.    Vitals:  height is 5' 7" (1.702 m) and weight is 100.7 kg (222 lb). Her tympanic temperature is 97.8 °F (36.6 °C). Her blood pressure is 109/65 and her pulse is 87. Her respiration is 20 and oxygen saturation is 97%.     Chief Complaint: Fever    Patient presents with post nasal drip body aches fever sore throat headaches. Symptoms started yesterday.  Taking mucinex cold and flu    Fever   This is a new problem. The current episode started yesterday. The problem occurs constantly. The problem has been unchanged. Her temperature was unmeasured prior to arrival. Associated symptoms include congestion, coughing, headaches, muscle aches, nausea and a sore throat. Pertinent negatives include no abdominal pain, chest pain, diarrhea, ear pain, rash, sleepiness, urinary pain, vomiting or wheezing. She has tried NSAIDs (above) for the symptoms. The treatment provided mild relief.   Risk factors: sick contacts    Risk factors: no contaminated food, no contaminated water, no hx of cancer, no immunosuppression, no occupational exposure, no recent sickness and no recent travel        Constitution: Positive for fever.   HENT:  Positive for congestion and sore throat. Negative for ear pain.    Cardiovascular:  Negative for chest pain.   Respiratory:  Positive for cough. Negative for wheezing.    Gastrointestinal:  Positive for nausea. Negative for abdominal pain, vomiting and diarrhea.   Genitourinary:  Negative for dysuria.   Skin:  Negative for rash.   Neurological:  Positive for headaches.      Objective:     Vitals:    12/27/23 1620   BP: 109/65   BP Location: Right arm   Patient Position: Sitting   BP Method: Medium (Automatic)   Pulse: 87   Resp: 20   Temp: 97.8 °F (36.6 °C)   TempSrc: Tympanic   SpO2: 97%   Weight: 100.7 kg (222 lb)   Height: 5' 7" (1.702 m)       Physical Exam   Constitutional: She is oriented to person, place, and time. She appears " well-developed. She is cooperative.  Non-toxic appearance. She does not appear ill. No distress.   HENT:   Head: Normocephalic and atraumatic.   Ears:   Right Ear: Hearing, tympanic membrane, external ear and ear canal normal.   Left Ear: Hearing, tympanic membrane, external ear and ear canal normal.   Nose: Rhinorrhea and congestion present. No mucosal edema or nasal deformity. No epistaxis. Right sinus exhibits no maxillary sinus tenderness and no frontal sinus tenderness. Left sinus exhibits no maxillary sinus tenderness and no frontal sinus tenderness.   Mouth/Throat: Uvula is midline, oropharynx is clear and moist and mucous membranes are normal. No trismus in the jaw. Normal dentition. No uvula swelling. No oropharyngeal exudate, posterior oropharyngeal edema or posterior oropharyngeal erythema.   Eyes: Conjunctivae and lids are normal. No scleral icterus.   Neck: Trachea normal and phonation normal. Neck supple. No edema present. No erythema present. No neck rigidity present.   Cardiovascular: Normal rate, regular rhythm, normal heart sounds and normal pulses.   Pulmonary/Chest: Effort normal. No respiratory distress. She has no decreased breath sounds. She has wheezes. She has no rhonchi.   Abdominal: Normal appearance.   Musculoskeletal: Normal range of motion.         General: No deformity. Normal range of motion.   Neurological: She is alert and oriented to person, place, and time. She exhibits normal muscle tone. Coordination normal.   Skin: Skin is warm, dry, intact, not diaphoretic and not pale.   Psychiatric: Her speech is normal and behavior is normal. Judgment and thought content normal.   Nursing note and vitals reviewed.      Assessment:     1. Wheezing    2. Fever, unspecified fever cause    3. Sore throat      Results for orders placed or performed in visit on 12/27/23   POCT Influenza A/B MOLECULAR   Result Value Ref Range    POC Molecular Influenza A Ag Negative Negative, Not Reported    POC  Molecular Influenza B Ag Negative Negative, Not Reported     Acceptable Yes    SARS Coronavirus 2 Antigen, POCT Manual Read   Result Value Ref Range    SARS Coronavirus 2 Antigen Negative Negative     Acceptable Yes    POCT Strep A, Molecular   Result Value Ref Range    Molecular Strep A, POC Negative Negative     Acceptable Yes        Plan:   Patient stable for discharge and home management of condition      Wheezing  -     albuterol (VENTOLIN HFA) 90 mcg/actuation inhaler; Inhale 2 puffs into the lungs every 6 (six) hours as needed for Wheezing. Inhaler  Dispense: 18 g; Refill: 1    Fever, unspecified fever cause  -     POCT Influenza A/B MOLECULAR  -     SARS Coronavirus 2 Antigen, POCT Manual Read  -     POCT Strep A, Molecular    Sore throat  -     POCT Strep A, Molecular      Patient Instructions   Increase fluids Get plenty of rest.   General infection control measures--cover mouth with sneezing coughing, wash hands frequently   Rest activity ad belle   Tylenol or advil per package directions for fever body aches   Albuterol inhaler every 4-6 hrs prn cough   Supportive care measures   Warm salt water gargles for throat comfort    Viral infections usually resolve in 7-10 days; If symptoms persist or worsen follow up [facility] or PCP    Repeat Covid testing as needed

## 2024-01-16 ENCOUNTER — TELEPHONE (OUTPATIENT)
Dept: OBSTETRICS AND GYNECOLOGY | Facility: CLINIC | Age: 39
End: 2024-01-16
Payer: COMMERCIAL

## 2024-01-16 NOTE — TELEPHONE ENCOUNTER
Sent message via [facility]    ----- Message from Ny Babcock sent at 1/10/2024  1:03 PM CST -----  Type:  Needs Medical Advice/appointment acceptance     Who Called: pt    Would the patient rather a call back or a response via [facility]? Call   Best Call Back Number: [phone]  Additional Information: pt would like to accept appointment that was offered on 2/8

## 2024-02-21 ENCOUNTER — TELEPHONE (OUTPATIENT)
Dept: OBSTETRICS AND GYNECOLOGY | Facility: CLINIC | Age: 39
End: 2024-02-21
Payer: COMMERCIAL

## 2024-02-21 NOTE — TELEPHONE ENCOUNTER
Spoke with pt. Pt was confused with the whole process. Said she had spoken to a different MA regarding scheduling and was told a couple of different things. I let pt know that  sees virtual's at 8am or 1pm. Informed pt that she would need to come in person. Pt accepted and will be here at 9:30am for in person consult.

## 2024-02-22 ENCOUNTER — TELEPHONE (OUTPATIENT)
Dept: OBSTETRICS AND GYNECOLOGY | Facility: CLINIC | Age: 39
End: 2024-02-22
Payer: COMMERCIAL

## 2024-02-22 ENCOUNTER — OFFICE VISIT (OUTPATIENT)
Dept: OBSTETRICS AND GYNECOLOGY | Facility: CLINIC | Age: 39
End: 2024-02-22
Payer: COMMERCIAL

## 2024-02-22 VITALS
WEIGHT: 224.44 LBS | DIASTOLIC BLOOD PRESSURE: 89 MMHG | SYSTOLIC BLOOD PRESSURE: 132 MMHG | BODY MASS INDEX: 35.15 KG/M2

## 2024-02-22 DIAGNOSIS — D25.1 FIBROIDS, INTRAMURAL: Primary | ICD-10-CM

## 2024-02-22 PROCEDURE — 99214 OFFICE O/P EST MOD 30 MIN: CPT | Mod: S$GLB,,, | Performed by: OBSTETRICS & GYNECOLOGY

## 2024-02-22 PROCEDURE — 3079F DIAST BP 80-89 MM HG: CPT | Mod: CPTII,S$GLB,, | Performed by: OBSTETRICS & GYNECOLOGY

## 2024-02-22 PROCEDURE — 3008F BODY MASS INDEX DOCD: CPT | Mod: CPTII,S$GLB,, | Performed by: OBSTETRICS & GYNECOLOGY

## 2024-02-22 PROCEDURE — 1159F MED LIST DOCD IN RCRD: CPT | Mod: CPTII,S$GLB,, | Performed by: OBSTETRICS & GYNECOLOGY

## 2024-02-22 PROCEDURE — 3075F SYST BP GE 130 - 139MM HG: CPT | Mod: CPTII,S$GLB,, | Performed by: OBSTETRICS & GYNECOLOGY

## 2024-02-22 PROCEDURE — 1160F RVW MEDS BY RX/DR IN RCRD: CPT | Mod: CPTII,S$GLB,, | Performed by: OBSTETRICS & GYNECOLOGY

## 2024-02-22 PROCEDURE — 99999 PR PBB SHADOW E&M-EST. PATIENT-LVL V: CPT | Mod: PBBFAC,,, | Performed by: OBSTETRICS & GYNECOLOGY

## 2024-02-22 NOTE — PROGRESS NOTES
CC: Symptoms related to fibroids    Saad Ayon is a 38 y.o. female  presents for a consultation for management of fibroids.      She reports being diagnosed with fibroids in 2016. She had obstruction of the bladder from the fibroids. She has had a history of left hydronephrosis but resolved after delivery. Cycles are heavy for the first 3-4 days. She uses super pads every 2 hours. She still has difficulty with pelvic pain due to size of fibroids.     She desires future fertility.     MRI shows:    FINDINGS:  The uterus measures 19.3 x 19.9 x 8.6 cm.  There are multiple fibroids within the uterus with the largest being a posterior fundal fibroid measuring 8.2 x 7.6 x 9.9 cm.  This largest fibroid does not show any significant enhancement.  Another large fibroid in the subserosal right fundus shows no enhancement and measures 6.6 x 6.2 x 6.6 cm.  And there is another smaller fibroid in the right fundal region showing no enhancement measuring 3.0 x 2.7 cm.  Otherwise the remaining fibroids all enhance with contrast administration.     The endometrial stripe is not thickened measuring 8 mm.  No endometrial polyp.  No junctional zone thickening.  No suspicious cervical mass.  There are few nabothian cysts in the cervix.  Vagina has a normal appearance.     Normal urinary bladder and urethra.  The right ovary has a normal appearance and measures 3.5 x 2.4 x 2.5 cm.  The left ovary has a normal appearance and measures 3.6 x 2.1 x  2.8 cm.     Impression:     1. Markedly enlarged multiple fibroid uterus with large fibroids as described above.  Of note a few of the largest fibroids do not enhance with contrast administration.  2. No endometrial polyp or endometrial thickening.  3. No ovarian or adnexal mass.        Past Medical History:   Diagnosis Date    Fibroids     Herpes     on bloodwork only in  - never had an outbreak       Past Surgical History:   Procedure Laterality Date    WISDOM TOOTH EXTRACTION          Family History   Adopted: Yes       Social History     Tobacco Use    Smoking status: Never    Smokeless tobacco: Never   Substance Use Topics    Alcohol use: Not Currently    Drug use: No       OB History    Para Term  AB Living   1 1   1   1   SAB IAB Ectopic Multiple Live Births           1      # Outcome Date GA Lbr Brendan/2nd Weight Sex Delivery Anes PTL Lv   1  17    M Vag-Spont   GAYE       /89   Wt 101.8 kg (224 lb 6.9 oz)   LMP 2024 (Exact Date)   BMI 35.15 kg/m²     ROS:  GENERAL: Denies weight gain or weight loss. Feeling well overall.   SKIN: Denies rash or lesions.   HEAD: Denies head injury or headache.   NODES: Denies enlarged lymph nodes.   CHEST: Denies chest pain or shortness of breath.   CARDIOVASCULAR: Denies palpitations or left sided chest pain.   ABDOMEN: No abdominal pain, constipation, diarrhea, nausea, vomiting or rectal bleeding.   URINARY: No frequency, dysuria, hematuria, or burning on urination.  REPRODUCTIVE: See HPI.   BREASTS: The patient performs breast self-examination and denies pain, lumps, or nipple discharge.   HEMATOLOGIC: No easy bruisability or excessive bleeding with the exception of menstrual cycles.  MUSCULOSKELETAL: Denies joint pain or swelling.   NEUROLOGIC: Denies syncope or weakness.   PSYCHIATRIC: Denies depression, anxiety or mood swings.    APPEARANCE: Well nourished, well developed, in no acute distress.  AFFECT: WNL, alert and oriented x 3  SKIN: No acne or hirsutism  NECK: Neck symmetric without masses or thyromegaly  NODES: No inguinal, cervical, axillary, or femoral lymph node enlargement  CHEST: Good respiratory effect  ABDOMEN: Soft.  No tenderness or masses.  No hepatosplenomegaly.  No hernias.  PELVIC: Normal external genitalia without lesions.  Normal hair distribution.  Adequate perineal body, normal urethral meatus.  Vagina moist and well rugated without lesions or discharge.  Cervix pink, without lesions,  discharge or tenderness.  No significant cystocele or rectocele.  Bimanual exam shows uterus to be 20 week size, irregular, mobile and nontender.  Adnexa without masses or tenderness.    EXTREMITIES: No edema.      ICD-10-CM ICD-9-CM    1. Fibroids, intramural  D25.1 218.1             Patient was counseled today on treatment options for fibroids. Because her symptoms are related to the bulkiness of her uterus, discussed treatment options - myomectomy vs. Hysterectomy. Patient desires a myomectomy. Recommended an abdominal approach due to the size of the uterus.  Discussed the need to delay conception for 4 months, risk of recurrence of fibroids, and the need for CS for delivery.      Myomectomy 5/7

## 2024-02-22 NOTE — TELEPHONE ENCOUNTER
Spoke with pt, informed pt that I had spoken with  and she said that the pt does not need to stop taking her herbal supplements before surgery

## 2024-03-05 RX ORDER — ACETAMINOPHEN 500 MG
1000 TABLET ORAL
Status: CANCELLED | OUTPATIENT
Start: 2024-03-05

## 2024-03-05 RX ORDER — HYDROCODONE BITARTRATE AND ACETAMINOPHEN 5; 325 MG/1; MG/1
1 TABLET ORAL EVERY 4 HOURS PRN
Status: CANCELLED | OUTPATIENT
Start: 2024-03-05

## 2024-03-05 RX ORDER — CEFAZOLIN SODIUM 2 G/50ML
2 SOLUTION INTRAVENOUS
Status: CANCELLED | OUTPATIENT
Start: 2024-03-05

## 2024-03-05 RX ORDER — AMOXICILLIN 250 MG
1 CAPSULE ORAL 2 TIMES DAILY
Status: CANCELLED | OUTPATIENT
Start: 2024-03-05

## 2024-03-05 RX ORDER — POLYETHYLENE GLYCOL 3350 17 G/17G
17 POWDER, FOR SOLUTION ORAL DAILY
Status: CANCELLED | OUTPATIENT
Start: 2024-03-05

## 2024-03-05 RX ORDER — PROCHLORPERAZINE EDISYLATE 5 MG/ML
5 INJECTION INTRAMUSCULAR; INTRAVENOUS EVERY 10 MIN PRN
Status: CANCELLED | OUTPATIENT
Start: 2024-03-05

## 2024-03-05 RX ORDER — DIPHENHYDRAMINE HCL 25 MG
25 CAPSULE ORAL EVERY 4 HOURS PRN
Status: CANCELLED | OUTPATIENT
Start: 2024-03-05

## 2024-03-05 RX ORDER — CELECOXIB 100 MG/1
400 CAPSULE ORAL
Status: CANCELLED | OUTPATIENT
Start: 2024-03-05

## 2024-03-05 RX ORDER — HYDROMORPHONE HYDROCHLORIDE 1 MG/ML
1 INJECTION, SOLUTION INTRAMUSCULAR; INTRAVENOUS; SUBCUTANEOUS EVERY 4 HOURS PRN
Status: CANCELLED | OUTPATIENT
Start: 2024-03-05

## 2024-03-05 RX ORDER — HYDROCODONE BITARTRATE AND ACETAMINOPHEN 10; 325 MG/1; MG/1
1 TABLET ORAL EVERY 4 HOURS PRN
Status: CANCELLED | OUTPATIENT
Start: 2024-03-05

## 2024-03-05 RX ORDER — FAMOTIDINE 20 MG/1
20 TABLET, FILM COATED ORAL
Status: CANCELLED | OUTPATIENT
Start: 2024-03-05

## 2024-03-05 RX ORDER — PROCHLORPERAZINE EDISYLATE 5 MG/ML
5 INJECTION INTRAMUSCULAR; INTRAVENOUS EVERY 6 HOURS PRN
Status: CANCELLED | OUTPATIENT
Start: 2024-03-05

## 2024-03-05 RX ORDER — KETOROLAC TROMETHAMINE 30 MG/ML
30 INJECTION, SOLUTION INTRAMUSCULAR; INTRAVENOUS
Status: CANCELLED | OUTPATIENT
Start: 2024-03-05 | End: 2024-03-06

## 2024-03-05 RX ORDER — SODIUM CHLORIDE 0.9 % (FLUSH) 0.9 %
3 SYRINGE (ML) INJECTION
Status: CANCELLED | OUTPATIENT
Start: 2024-03-05

## 2024-03-05 RX ORDER — SODIUM CHLORIDE, SODIUM LACTATE, POTASSIUM CHLORIDE, CALCIUM CHLORIDE 600; 310; 30; 20 MG/100ML; MG/100ML; MG/100ML; MG/100ML
INJECTION, SOLUTION INTRAVENOUS CONTINUOUS
Status: CANCELLED | OUTPATIENT
Start: 2024-03-05

## 2024-03-05 RX ORDER — MEPERIDINE HYDROCHLORIDE 50 MG/ML
12.5 INJECTION INTRAMUSCULAR; INTRAVENOUS; SUBCUTANEOUS ONCE AS NEEDED
Status: CANCELLED | OUTPATIENT
Start: 2024-03-05 | End: 2024-03-06

## 2024-03-05 RX ORDER — ONDANSETRON 8 MG/1
8 TABLET, ORALLY DISINTEGRATING ORAL EVERY 8 HOURS PRN
Status: CANCELLED | OUTPATIENT
Start: 2024-03-05

## 2024-03-05 RX ORDER — MUPIROCIN 20 MG/G
OINTMENT TOPICAL
Status: CANCELLED | OUTPATIENT
Start: 2024-03-05

## 2024-03-05 RX ORDER — LIDOCAINE HYDROCHLORIDE 10 MG/ML
0.5 INJECTION, SOLUTION EPIDURAL; INFILTRATION; INTRACAUDAL; PERINEURAL
Status: CANCELLED | OUTPATIENT
Start: 2024-03-05

## 2024-03-05 RX ORDER — ONDANSETRON HYDROCHLORIDE 2 MG/ML
4 INJECTION, SOLUTION INTRAVENOUS DAILY PRN
Status: CANCELLED | OUTPATIENT
Start: 2024-03-05

## 2024-03-05 RX ORDER — DIPHENHYDRAMINE HYDROCHLORIDE 50 MG/ML
25 INJECTION INTRAMUSCULAR; INTRAVENOUS EVERY 4 HOURS PRN
Status: CANCELLED | OUTPATIENT
Start: 2024-03-05

## 2024-03-05 RX ORDER — PREGABALIN 50 MG/1
50 CAPSULE ORAL
Status: CANCELLED | OUTPATIENT
Start: 2024-03-05

## 2024-03-05 RX ORDER — IBUPROFEN 400 MG/1
800 TABLET ORAL
Status: CANCELLED | OUTPATIENT
Start: 2024-03-06

## 2024-03-05 RX ORDER — ACETAMINOPHEN 325 MG/1
650 TABLET ORAL EVERY 4 HOURS PRN
Status: CANCELLED | OUTPATIENT
Start: 2024-03-05

## 2024-03-05 RX ORDER — HYDROMORPHONE HYDROCHLORIDE 1 MG/ML
0.2 INJECTION, SOLUTION INTRAMUSCULAR; INTRAVENOUS; SUBCUTANEOUS EVERY 5 MIN PRN
Status: CANCELLED | OUTPATIENT
Start: 2024-03-05

## 2024-03-16 ENCOUNTER — OFFICE VISIT (OUTPATIENT)
Dept: URGENT CARE | Facility: CLINIC | Age: 39
End: 2024-03-16
Payer: COMMERCIAL

## 2024-03-16 VITALS
HEIGHT: 68 IN | SYSTOLIC BLOOD PRESSURE: 125 MMHG | HEART RATE: 101 BPM | RESPIRATION RATE: 18 BRPM | OXYGEN SATURATION: 98 % | BODY MASS INDEX: 33.95 KG/M2 | DIASTOLIC BLOOD PRESSURE: 88 MMHG | WEIGHT: 224 LBS | TEMPERATURE: 98 F

## 2024-03-16 DIAGNOSIS — R42 DIZZINESS: Primary | ICD-10-CM

## 2024-03-16 LAB
B-HCG UR QL: NEGATIVE
BILIRUB UR QL STRIP: NEGATIVE
CTP QC/QA: YES
GLUCOSE UR QL STRIP: NEGATIVE
KETONES UR QL STRIP: NEGATIVE
LEUKOCYTE ESTERASE UR QL STRIP: NEGATIVE
PH, POC UA: 5
POC BLOOD, URINE: POSITIVE
POC NITRATES, URINE: NEGATIVE
PROT UR QL STRIP: NEGATIVE
SP GR UR STRIP: 1.02 (ref 1–1.03)
UROBILINOGEN UR STRIP-ACNC: NORMAL (ref 0.1–1.1)

## 2024-03-16 PROCEDURE — 80048 BASIC METABOLIC PNL TOTAL CA: CPT | Performed by: NURSE PRACTITIONER

## 2024-03-16 PROCEDURE — 84443 ASSAY THYROID STIM HORMONE: CPT | Performed by: NURSE PRACTITIONER

## 2024-03-16 PROCEDURE — 85025 COMPLETE CBC W/AUTO DIFF WBC: CPT | Performed by: NURSE PRACTITIONER

## 2024-03-16 PROCEDURE — 93010 ELECTROCARDIOGRAM REPORT: CPT | Mod: S$GLB,,, | Performed by: INTERNAL MEDICINE

## 2024-03-16 PROCEDURE — 93005 ELECTROCARDIOGRAM TRACING: CPT | Mod: S$GLB,,, | Performed by: NURSE PRACTITIONER

## 2024-03-16 PROCEDURE — 81025 URINE PREGNANCY TEST: CPT | Mod: S$GLB,,, | Performed by: NURSE PRACTITIONER

## 2024-03-16 PROCEDURE — 81003 URINALYSIS AUTO W/O SCOPE: CPT | Mod: QW,S$GLB,, | Performed by: NURSE PRACTITIONER

## 2024-03-16 PROCEDURE — 99214 OFFICE O/P EST MOD 30 MIN: CPT | Mod: S$GLB,,, | Performed by: NURSE PRACTITIONER

## 2024-03-16 PROCEDURE — 84439 ASSAY OF FREE THYROXINE: CPT | Performed by: NURSE PRACTITIONER

## 2024-03-16 NOTE — PROGRESS NOTES
"Subjective:      Patient ID: Saad Ayon is a 38 y.o. female.    Vitals:  height is 5' 7.76" (1.721 m) and weight is 101.6 kg (223 lb 15.8 oz). Her temperature is 98.3 °F (36.8 °C). Her blood pressure is 125/88 and her pulse is 101. Her respiration is 18 and oxygen saturation is 98%.     Chief Complaint: No chief complaint on file.    PT presents today with feeling very lethargic, high heart rate, headache. She states that when her rate goes up she starts to feel real light headed.       Neurological:  Positive for dizziness.      Objective:     Vitals:    03/16/24 1341 03/16/24 1355 03/16/24 1400 03/16/24 1402   BP: 134/83 124/78  Comment: lying x5 minutes. (Abnormal) 139/94  Comment: standing x1 minute. slight dizziness. 125/88  Comment: Standing x3 minutes. Dizziness Improved.   BP Location: Right arm Right arm Right arm Right arm   Patient Position: Sitting Lying Standing Standing   BP Method: Large (Automatic) Large (Automatic) Large (Automatic) Large (Automatic)   Pulse: 106 95 104 101   Resp: 18      Temp: 98.3 °F (36.8 °C)      SpO2: 98%      Weight: 101.6 kg (223 lb 15.8 oz)      Height: 5' 7.76" (1.721 m)          Physical Exam   Constitutional: She is oriented to person, place, and time. She appears well-developed. She is cooperative.  Non-toxic appearance. She does not appear ill. No distress.   HENT:   Head: Normocephalic and atraumatic.   Ears:   Right Ear: Hearing, tympanic membrane, external ear and ear canal normal.   Left Ear: Hearing, tympanic membrane, external ear and ear canal normal.   Nose: No mucosal edema, rhinorrhea or nasal deformity. No epistaxis. Right sinus exhibits no maxillary sinus tenderness and no frontal sinus tenderness. Left sinus exhibits no maxillary sinus tenderness and no frontal sinus tenderness.   Mouth/Throat: Uvula is midline, oropharynx is clear and moist and mucous membranes are normal. Mucous membranes are moist. No trismus in the jaw. Normal dentition. No uvula " swelling. No oropharyngeal exudate, posterior oropharyngeal edema or posterior oropharyngeal erythema.   Eyes: Conjunctivae and lids are normal. Pupils are equal, round, and reactive to light. Right eye exhibits no discharge. Left eye exhibits no discharge. No scleral icterus. Extraocular movement intact   Neck: Trachea normal and phonation normal. Neck supple. No edema present. No erythema present. No neck rigidity present.   Cardiovascular: Normal rate, regular rhythm, normal heart sounds and normal pulses.   Pulmonary/Chest: Effort normal and breath sounds normal. No respiratory distress. She has no decreased breath sounds. She has no rhonchi.   Abdominal: Normal appearance. She exhibits no distension and no mass. There is no abdominal tenderness.   Musculoskeletal: Normal range of motion.         General: No deformity. Normal range of motion.   Neurological: no focal deficit. She is alert and oriented to person, place, and time. She exhibits normal muscle tone. Coordination normal.   Skin: Skin is warm, dry, intact, not diaphoretic and not pale. Capillary refill takes less than 2 seconds.   Psychiatric: Her speech is normal and behavior is normal. Judgment and thought content normal.   Nursing note and vitals reviewed.      Assessment:     1. Dizziness      Results for orders placed or performed in visit on 03/16/24   CBC W/ AUTO DIFFERENTIAL   Result Value Ref Range    WBC 11.33 3.90 - 12.70 K/uL    RBC 4.21 4.00 - 5.40 M/uL    Hemoglobin 12.3 12.0 - 16.0 g/dL    Hematocrit 38.0 37.0 - 48.5 %    MCV 90 82 - 98 fL    MCH 29.2 27.0 - 31.0 pg    MCHC 32.4 32.0 - 36.0 g/dL    RDW 13.1 11.5 - 14.5 %    Platelets 417 150 - 450 K/uL    MPV 11.1 9.2 - 12.9 fL    Immature Granulocytes 0.3 0.0 - 0.5 %    Gran # (ANC) 7.5 1.8 - 7.7 K/uL    Immature Grans (Abs) 0.03 0.00 - 0.04 K/uL    Lymph # 2.8 1.0 - 4.8 K/uL    Mono # 0.8 0.3 - 1.0 K/uL    Eos # 0.1 0.0 - 0.5 K/uL    Baso # 0.06 0.00 - 0.20 K/uL    nRBC 0 0 /100 WBC     Gran % 66.3 38.0 - 73.0 %    Lymph % 24.6 18.0 - 48.0 %    Mono % 7.1 4.0 - 15.0 %    Eosinophil % 1.2 0.0 - 8.0 %    Basophil % 0.5 0.0 - 1.9 %    Differential Method Automated    T4, FREE   Result Value Ref Range    Free T4 0.97 0.71 - 1.51 ng/dL   TSH   Result Value Ref Range    TSH 2.132 0.400 - 4.000 uIU/mL   BASIC METABOLIC PANEL   Result Value Ref Range    Sodium 135 (L) 136 - 145 mmol/L    Potassium 3.9 3.5 - 5.1 mmol/L    Chloride 102 95 - 110 mmol/L    CO2 24 23 - 29 mmol/L    Glucose 85 70 - 110 mg/dL    BUN 13 6 - 20 mg/dL    Creatinine 0.9 0.5 - 1.4 mg/dL    Calcium 9.8 8.7 - 10.5 mg/dL    Anion Gap 9 8 - 16 mmol/L    eGFR >60.0 >60 mL/min/1.73 m^2   POCT urine pregnancy   Result Value Ref Range    POC Preg Test, Ur Negative Negative     Acceptable Yes    POCT Urinalysis, Dipstick, Automated, W/O Scope   Result Value Ref Range    POC Blood, Urine Positive (A) Negative    POC Bilirubin, Urine Negative Negative    POC Urobilinogen, Urine normal 0.1 - 1.1    POC Ketones, Urine Negative Negative    POC Protein, Urine Negative Negative    POC Nitrates, Urine Negative Negative    POC Glucose, Urine Negative Negative    pH, UA 5.0     POC Specific Gravity, Urine 1.020 1.003 - 1.029    POC Leukocytes, Urine Negative Negative     Ekg as reviewed by me 90 bpm  normal rate and rhythm. No noted pvcs, no st ot wave elevations or depressions     Plan:   Patient stable for discharge and home management of condition      Dizziness  -     POCT urine pregnancy  -     POCT Urinalysis, Dipstick, Automated, W/O Scope  -     CBC W/ AUTO DIFFERENTIAL; Future; Expected date: 03/16/2024  -     BASIC METABOLIC PANEL; Future; Expected date: 03/16/2024  -     TSH; Future; Expected date: 03/16/2024  -     T4, FREE; Future; Expected date: 03/16/2024  -     IN OFFICE EKG 12-LEAD (to Muse)        Patient Instructions   Maintain hydration   Labs pending   Upon receipt and review You will be able to share with PCP and or  specialists as needed   Rest activity ad belle   Avoid > stimulant/caffeine intake.    Follow up as needed with [facility];     Any chest pain, SOB, or additional concerning symptoms follow up to local ER

## 2024-03-16 NOTE — PATIENT INSTRUCTIONS
Maintain hydration   Labs pending   Upon receipt and review You will be able to share with PCP and or specialists as needed   Rest activity ad belle   Avoid > stimulant/caffeine intake.    Follow up as needed with [facility];     Any chest pain, SOB, or additional concerning symptoms follow up to local ER

## 2024-03-17 ENCOUNTER — TELEPHONE (OUTPATIENT)
Dept: URGENT CARE | Facility: CLINIC | Age: 39
End: 2024-03-17
Payer: COMMERCIAL

## 2024-03-17 LAB
ANION GAP SERPL CALC-SCNC: 9 MMOL/L (ref 8–16)
BASOPHILS # BLD AUTO: 0.06 K/UL (ref 0–0.2)
BASOPHILS NFR BLD: 0.5 % (ref 0–1.9)
BUN SERPL-MCNC: 13 MG/DL (ref 6–20)
CALCIUM SERPL-MCNC: 9.8 MG/DL (ref 8.7–10.5)
CHLORIDE SERPL-SCNC: 102 MMOL/L (ref 95–110)
CO2 SERPL-SCNC: 24 MMOL/L (ref 23–29)
CREAT SERPL-MCNC: 0.9 MG/DL (ref 0.5–1.4)
DIFFERENTIAL METHOD BLD: NORMAL
EOSINOPHIL # BLD AUTO: 0.1 K/UL (ref 0–0.5)
EOSINOPHIL NFR BLD: 1.2 % (ref 0–8)
ERYTHROCYTE [DISTWIDTH] IN BLOOD BY AUTOMATED COUNT: 13.1 % (ref 11.5–14.5)
EST. GFR  (NO RACE VARIABLE): >60 ML/MIN/1.73 M^2
GLUCOSE SERPL-MCNC: 85 MG/DL (ref 70–110)
HCT VFR BLD AUTO: 38 % (ref 37–48.5)
HGB BLD-MCNC: 12.3 G/DL (ref 12–16)
IMM GRANULOCYTES # BLD AUTO: 0.03 K/UL (ref 0–0.04)
IMM GRANULOCYTES NFR BLD AUTO: 0.3 % (ref 0–0.5)
LYMPHOCYTES # BLD AUTO: 2.8 K/UL (ref 1–4.8)
LYMPHOCYTES NFR BLD: 24.6 % (ref 18–48)
MCH RBC QN AUTO: 29.2 PG (ref 27–31)
MCHC RBC AUTO-ENTMCNC: 32.4 G/DL (ref 32–36)
MCV RBC AUTO: 90 FL (ref 82–98)
MONOCYTES # BLD AUTO: 0.8 K/UL (ref 0.3–1)
MONOCYTES NFR BLD: 7.1 % (ref 4–15)
NEUTROPHILS # BLD AUTO: 7.5 K/UL (ref 1.8–7.7)
NEUTROPHILS NFR BLD: 66.3 % (ref 38–73)
NRBC BLD-RTO: 0 /100 WBC
PLATELET # BLD AUTO: 417 K/UL (ref 150–450)
PMV BLD AUTO: 11.1 FL (ref 9.2–12.9)
POTASSIUM SERPL-SCNC: 3.9 MMOL/L (ref 3.5–5.1)
RBC # BLD AUTO: 4.21 M/UL (ref 4–5.4)
SODIUM SERPL-SCNC: 135 MMOL/L (ref 136–145)
T4 FREE SERPL-MCNC: 0.97 NG/DL (ref 0.71–1.51)
TSH SERPL DL<=0.005 MIU/L-ACNC: 2.13 UIU/ML (ref 0.4–4)
WBC # BLD AUTO: 11.33 K/UL (ref 3.9–12.7)

## 2024-03-17 NOTE — TELEPHONE ENCOUNTER
Labs reviewed and overall no critical results   Negligible lower sodium level 135  LM message to return call to office 03/18/2024 with any questions or updates;   Per visit 03/16/2024 patient was scheduled to see PCP later this week

## 2024-03-18 LAB
OHS QRS DURATION: 74 MS
OHS QTC CALCULATION: 459 MS

## 2024-04-26 ENCOUNTER — OFFICE VISIT (OUTPATIENT)
Dept: URGENT CARE | Facility: CLINIC | Age: 39
End: 2024-04-26
Payer: COMMERCIAL

## 2024-04-26 VITALS
BODY MASS INDEX: 35.47 KG/M2 | HEIGHT: 67 IN | OXYGEN SATURATION: 98 % | RESPIRATION RATE: 18 BRPM | SYSTOLIC BLOOD PRESSURE: 141 MMHG | DIASTOLIC BLOOD PRESSURE: 86 MMHG | WEIGHT: 226 LBS | HEART RATE: 87 BPM | TEMPERATURE: 98 F

## 2024-04-26 DIAGNOSIS — L50.9 URTICARIA: ICD-10-CM

## 2024-04-26 DIAGNOSIS — T78.40XA ALLERGIC REACTION TO DRUG, INITIAL ENCOUNTER: Primary | ICD-10-CM

## 2024-04-26 PROCEDURE — 96372 THER/PROPH/DIAG INJ SC/IM: CPT | Mod: S$GLB,,, | Performed by: NURSE PRACTITIONER

## 2024-04-26 PROCEDURE — 99213 OFFICE O/P EST LOW 20 MIN: CPT | Mod: 25,S$GLB,, | Performed by: NURSE PRACTITIONER

## 2024-04-26 RX ORDER — MINERAL OIL
180 ENEMA (ML) RECTAL DAILY
Qty: 60 TABLET | Refills: 0 | Status: SHIPPED | OUTPATIENT
Start: 2024-04-26

## 2024-04-26 RX ORDER — HYDROXYZINE PAMOATE 25 MG/1
25 CAPSULE ORAL EVERY 6 HOURS PRN
Qty: 30 CAPSULE | Refills: 0 | Status: ON HOLD | OUTPATIENT
Start: 2024-04-26 | End: 2024-05-07 | Stop reason: HOSPADM

## 2024-04-26 RX ORDER — PREDNISONE 20 MG/1
40 TABLET ORAL DAILY
Qty: 8 TABLET | Refills: 0 | Status: SHIPPED | OUTPATIENT
Start: 2024-04-27 | End: 2024-05-01

## 2024-04-26 RX ORDER — FAMOTIDINE 40 MG/1
20 TABLET, FILM COATED ORAL DAILY
Qty: 7 TABLET | Refills: 0 | Status: SHIPPED | OUTPATIENT
Start: 2024-04-26 | End: 2024-05-10

## 2024-04-26 RX ORDER — FAMOTIDINE 20 MG/1
40 TABLET, FILM COATED ORAL
Status: COMPLETED | OUTPATIENT
Start: 2024-04-26 | End: 2024-04-26

## 2024-04-26 RX ORDER — TRIAMCINOLONE ACETONIDE 5 MG/G
CREAM TOPICAL 2 TIMES DAILY PRN
Qty: 30 G | Refills: 1 | Status: ON HOLD | OUTPATIENT
Start: 2024-04-26 | End: 2024-05-07 | Stop reason: HOSPADM

## 2024-04-26 RX ADMIN — FAMOTIDINE 40 MG: 20 TABLET, FILM COATED ORAL at 08:04

## 2024-04-26 NOTE — PATIENT INSTRUCTIONS
Rest activity ad belle   Avoid hot showers and baths   Allegra 180 mg daily   Pepcid 20 mg daily x 2 weeks   Prednisone 40 mg daily x 5 days   Vistaril 25 mg every 6 hrs as needed for itching     Keep skin moisturized

## 2024-04-26 NOTE — PROGRESS NOTES
"Subjective:      Patient ID: Saad Ayon is a 39 y.o. female.    Vitals:  height is 5' 7.01" (1.702 m) and weight is 102.5 kg (226 lb). Her temperature is 97.7 °F (36.5 °C). Her blood pressure is 141/86 (abnormal) and her pulse is 87. Her respiration is 18 and oxygen saturation is 98%.     Chief Complaint: Rash and Hand Pain    Patient is presenting a rash on on both hands and on her right elbow. She took benadryl. Her hands were swelling. Rash began yesterday.    Rash  This is a new problem. The current episode started yesterday. The problem has been gradually worsening since onset. The affected locations include the left hand, right hand and right arm. The rash is characterized by itchiness and swelling. She was exposed to nothing. Pertinent negatives include no anorexia, congestion, cough, diarrhea, eye pain, facial edema, fatigue, fever, joint pain, nail changes, rhinorrhea, shortness of breath, sore throat or vomiting. Past treatments include nothing. The treatment provided no relief. There is no history of allergies, asthma, eczema or varicella.   Hand Pain   The incident occurred 6 to 12 hours ago. The incident occurred at home. There was no injury mechanism. The pain is present in the right elbow, right hand and left hand. The pain is at a severity of 0/10. The patient is experiencing no pain. Pertinent negatives include no chest pain, muscle weakness, numbness or tingling. Nothing aggravates the symptoms. She has tried nothing for the symptoms. The treatment provided no relief.       Constitution: Negative for fatigue and fever.   HENT:  Negative for congestion and sore throat.    Cardiovascular:  Negative for chest pain.   Eyes:  Negative for eye pain.   Respiratory:  Negative for cough and shortness of breath.    Gastrointestinal:  Negative for vomiting and diarrhea.   Skin:  Positive for rash.   Neurological:  Negative for numbness.      Objective:     Vitals:    04/26/24 0804   BP: (!) 141/86   BP " "Location: Left arm   Patient Position: Sitting   BP Method: Large (Automatic)   Pulse: 87   Resp: 18   Temp: 97.7 °F (36.5 °C)   SpO2: 98%   Weight: 102.5 kg (226 lb)   Height: 5' 7.01" (1.702 m)       Physical Exam   Constitutional: She is oriented to person, place, and time. She appears well-developed.  Non-toxic appearance. No distress.   HENT:   Head: Normocephalic and atraumatic. Head is without abrasion, without contusion and without laceration.   Ears:   Right Ear: Tympanic membrane, external ear and ear canal normal.   Left Ear: Tympanic membrane, external ear and ear canal normal.   Nose: Mucosal edema and rhinorrhea present.   Mouth/Throat: Uvula is midline, oropharynx is clear and moist and mucous membranes are normal. Mucous membranes are moist.   Eyes: Conjunctivae, EOM and lids are normal. Pupils are equal, round, and reactive to light.   Neck: Trachea normal and phonation normal. Neck supple.   Cardiovascular: Normal rate, regular rhythm and normal heart sounds.   Pulmonary/Chest: Effort normal and breath sounds normal. No stridor. No respiratory distress.   Abdominal: Normal appearance.   Musculoskeletal: Normal range of motion.         General: Normal range of motion.   Neurological: She is alert and oriented to person, place, and time.   Skin: Skin is warm, intact, rash and urticarial. Capillary refill takes less than 2 seconds. No abrasion, No burn and No ecchymosis         Comments: Generalized urticarial wheals waxing and waning    Psychiatric: Her speech is normal and behavior is normal. Judgment and thought content normal.   Nursing note and vitals reviewed.      Assessment:     1. Allergic reaction to drug, initial encounter    2. Urticaria        Plan:   Patient stable for discharge and home management of condition      Allergic reaction to drug, initial encounter  -     Discontinue: methylPREDNISolone sod suc(PF) injection 125 mg  -     famotidine tablet 40 mg  -     famotidine (PEPCID) 40 " MG tablet; Take 0.5 tablets (20 mg total) by mouth once daily. for 14 days  Dispense: 7 tablet; Refill: 0  -     predniSONE (DELTASONE) 20 MG tablet; Take 2 tablets (40 mg total) by mouth once daily. Take w food for 4 days  Dispense: 8 tablet; Refill: 0  -     fexofenadine (ALLEGRA) 180 MG tablet; Take 1 tablet (180 mg total) by mouth once daily.  Dispense: 60 tablet; Refill: 0  -     hydrOXYzine pamoate (VISTARIL) 25 MG Cap; Take 1 capsule (25 mg total) by mouth every 6 (six) hours as needed (itching, anxiety, insomnia). Caution sedating  Dispense: 30 capsule; Refill: 0  -     triamcinolone acetonide 0.5% (KENALOG) 0.5 % Crea; Apply topically 2 (two) times daily as needed (insect bite reactions). Never apply to face  Dispense: 30 g; Refill: 1  -     methylPREDNISolone sod suc(PF) injection 125 mg    Urticaria  -     Discontinue: methylPREDNISolone sod suc(PF) injection 125 mg  -     famotidine tablet 40 mg  -     famotidine (PEPCID) 40 MG tablet; Take 0.5 tablets (20 mg total) by mouth once daily. for 14 days  Dispense: 7 tablet; Refill: 0  -     predniSONE (DELTASONE) 20 MG tablet; Take 2 tablets (40 mg total) by mouth once daily. Take w food for 4 days  Dispense: 8 tablet; Refill: 0  -     fexofenadine (ALLEGRA) 180 MG tablet; Take 1 tablet (180 mg total) by mouth once daily.  Dispense: 60 tablet; Refill: 0  -     hydrOXYzine pamoate (VISTARIL) 25 MG Cap; Take 1 capsule (25 mg total) by mouth every 6 (six) hours as needed (itching, anxiety, insomnia). Caution sedating  Dispense: 30 capsule; Refill: 0  -     methylPREDNISolone sod suc(PF) injection 125 mg             Patient Instructions   Rest activity ad belle   Avoid hot showers and baths   Allegra 180 mg daily   Pepcid 20 mg daily x 2 weeks   Prednisone 40 mg daily x 5 days   Vistaril 25 mg every 6 hrs as needed for itching     Keep skin moisturized        No follow-ups on file.

## 2024-04-29 ENCOUNTER — TELEPHONE (OUTPATIENT)
Dept: OBSTETRICS AND GYNECOLOGY | Facility: CLINIC | Age: 39
End: 2024-04-29
Payer: COMMERCIAL

## 2024-05-02 ENCOUNTER — OFFICE VISIT (OUTPATIENT)
Dept: OBSTETRICS AND GYNECOLOGY | Facility: CLINIC | Age: 39
End: 2024-05-02
Payer: COMMERCIAL

## 2024-05-02 VITALS
BODY MASS INDEX: 35.42 KG/M2 | DIASTOLIC BLOOD PRESSURE: 92 MMHG | WEIGHT: 226.19 LBS | SYSTOLIC BLOOD PRESSURE: 132 MMHG

## 2024-05-02 DIAGNOSIS — Z01.818 PREOPERATIVE EXAM FOR GYNECOLOGIC SURGERY: Primary | ICD-10-CM

## 2024-05-02 PROCEDURE — 99999 PR PBB SHADOW E&M-EST. PATIENT-LVL III: CPT | Mod: PBBFAC,,, | Performed by: OBSTETRICS & GYNECOLOGY

## 2024-05-02 PROCEDURE — 99499 UNLISTED E&M SERVICE: CPT | Mod: S$GLB,,, | Performed by: OBSTETRICS & GYNECOLOGY

## 2024-05-02 NOTE — PROGRESS NOTES
CC: Preop exam    Saad Ayon is a 39 y.o. female  presents for a pre-op exam for an abdominal myomectomy scheduled on 2024.      She reports being diagnosed with fibroids in 2016. She had obstruction of the bladder from the fibroids. She has had a history of left hydronephrosis but resolved after delivery. Cycles are heavy for the first 3-4 days. She uses super pads every 2 hours. She still has difficulty with pelvic pain due to size of fibroids.      She desires future fertility.      MRI shows:     FINDINGS:  The uterus measures 19.3 x 19.9 x 8.6 cm.  There are multiple fibroids within the uterus with the largest being a posterior fundal fibroid measuring 8.2 x 7.6 x 9.9 cm.  This largest fibroid does not show any significant enhancement.  Another large fibroid in the subserosal right fundus shows no enhancement and measures 6.6 x 6.2 x 6.6 cm.  And there is another smaller fibroid in the right fundal region showing no enhancement measuring 3.0 x 2.7 cm.  Otherwise the remaining fibroids all enhance with contrast administration.     The endometrial stripe is not thickened measuring 8 mm.  No endometrial polyp.  No junctional zone thickening.  No suspicious cervical mass.  There are few nabothian cysts in the cervix.  Vagina has a normal appearance.     Normal urinary bladder and urethra.  The right ovary has a normal appearance and measures 3.5 x 2.4 x 2.5 cm.  The left ovary has a normal appearance and measures 3.6 x 2.1 x  2.8 cm.     Impression:     1. Markedly enlarged multiple fibroid uterus with large fibroids as described above.  Of note a few of the largest fibroids do not enhance with contrast administration.  2. No endometrial polyp or endometrial thickening.  3. No ovarian or adnexal mass.          Past Medical History:   Diagnosis Date    Fibroids     Herpes     on bloodwork only in  - never had an outbreak       Past Surgical History:   Procedure Laterality Date    WISDOM TOOTH  EXTRACTION         OB History    Para Term  AB Living   1 1   1   1   SAB IAB Ectopic Multiple Live Births           1      # Outcome Date GA Lbr Brendan/2nd Weight Sex Type Anes PTL Lv   1  17    M Vag-Spont   GAYE       Family History   Adopted: Yes       Social History     Tobacco Use    Smoking status: Never    Smokeless tobacco: Never   Substance Use Topics    Alcohol use: Not Currently    Drug use: No       BP (!) 132/92   Wt 102.6 kg (226 lb 3.1 oz)   LMP 2024 (Exact Date)   BMI 35.42 kg/m²     Current Outpatient Medications on File Prior to Visit   Medication Sig Dispense Refill    fexofenadine (ALLEGRA) 180 MG tablet Take 1 tablet (180 mg total) by mouth once daily. 60 tablet 0    drospirenone, contraceptive, (SLYND) 4 mg (28) Tab Take 1 tablet (4 mg total) by mouth once daily. (Patient not taking: Reported on 2024) 90 tablet 3    famotidine (PEPCID) 40 MG tablet Take 0.5 tablets (20 mg total) by mouth once daily. for 14 days (Patient not taking: Reported on 2024) 7 tablet 0    hydrOXYzine pamoate (VISTARIL) 25 MG Cap Take 1 capsule (25 mg total) by mouth every 6 (six) hours as needed (itching, anxiety, insomnia). Caution sedating (Patient not taking: Reported on 2024) 30 capsule 0    [] predniSONE (DELTASONE) 20 MG tablet Take 2 tablets (40 mg total) by mouth once daily. Take w food for 4 days 8 tablet 0    terconazole (TERAZOL 7) 0.4 % Crea Place 1 applicator vaginally every evening. (Patient not taking: Reported on 2024) 7 g 1    triamcinolone acetonide 0.5% (KENALOG) 0.5 % Crea Apply topically 2 (two) times daily as needed (insect bite reactions). Never apply to face (Patient not taking: Reported on 2024) 30 g 1     No current facility-administered medications on file prior to visit.        Review of patient's allergies indicates:   Allergen Reactions    Bactrim [sulfamethoxazole-trimethoprim] Hives        ROS:  GENERAL: Denies weight gain or  weight loss. Feeling well overall.   SKIN: Denies rash or lesions.   HEAD: Denies head injury or headache.   NODES: Denies enlarged lymph nodes.   CHEST: Denies chest pain or shortness of breath.   CARDIOVASCULAR: Denies palpitations or left sided chest pain.   ABDOMEN: No abdominal pain, constipation, diarrhea, nausea, vomiting or rectal bleeding.   URINARY: No frequency, dysuria, hematuria, or burning on urination.  REPRODUCTIVE: See HPI.   HEMATOLOGIC: No easy bruisability or excessive bleeding with the exception of menstrual cycles.  MUSCULOSKELETAL: Denies joint pain or swelling.   NEUROLOGIC: Denies syncope or weakness.   PSYCHIATRIC: Denies depression, anxiety or mood swings.    PHYSICAL EXAM:  APPEARANCE: Well nourished, well developed, in no acute distress.  AFFECT: WNL, alert and oriented x 3  SKIN: No acne or hirsutism  NECK: Neck symmetric without masses or thyromegaly  NODES: No inguinal, cervical, axillary, or femoral lymph node enlargement  CHEST: Good respiratory effect  ABDOMEN: Soft.  No tenderness or masses.  No hepatosplenomegaly.  No hernias.  PELVIC: Deferred  EXTREMITIES: No edema.      ICD-10-CM ICD-9-CM    1. Preoperative exam for gynecologic surgery  Z01.818 V72.83 TYPE AND SCREEN PREOP      CBC auto differential          Patient was counseled today on treatment options for fibroids. Because her symptoms are related to the bulkiness of her uterus, discussed treatment options - myomectomy vs. Hysterectomy. Patient desires a myomectomy. Recommended an abdominal approach due to the size of the uterus.  Discussed the need to delay conception for 4 months, risk of recurrence of fibroids, and the need for CS for delivery.     Surgical risks discussed including but not limited to risk of bleeding, infection, and injury to adjacent organs.     I have discussed the risks, benefits, indications, and alternatives of the procedure in detail.  The patient verbalizes her understanding.  All questions  answered.  Consents signed.  The patient agrees to proceed to proceed as planned.

## 2024-05-03 DIAGNOSIS — Z01.818 PREOPERATIVE EXAM FOR GYNECOLOGIC SURGERY: Primary | ICD-10-CM

## 2024-05-06 ENCOUNTER — PATIENT MESSAGE (OUTPATIENT)
Dept: OBSTETRICS AND GYNECOLOGY | Facility: CLINIC | Age: 39
End: 2024-05-06
Payer: COMMERCIAL

## 2024-05-06 ENCOUNTER — LAB VISIT (OUTPATIENT)
Dept: LAB | Facility: HOSPITAL | Age: 39
End: 2024-05-06
Attending: OBSTETRICS & GYNECOLOGY
Payer: COMMERCIAL

## 2024-05-06 DIAGNOSIS — Z01.818 PREOPERATIVE EXAM FOR GYNECOLOGIC SURGERY: ICD-10-CM

## 2024-05-06 LAB
ABO + RH BLD: NORMAL
BASOPHILS # BLD AUTO: 0.05 K/UL (ref 0–0.2)
BASOPHILS NFR BLD: 0.6 % (ref 0–1.9)
BLD GP AB SCN CELLS X3 SERPL QL: NORMAL
DIFFERENTIAL METHOD BLD: ABNORMAL
EOSINOPHIL # BLD AUTO: 0.2 K/UL (ref 0–0.5)
EOSINOPHIL NFR BLD: 2.7 % (ref 0–8)
ERYTHROCYTE [DISTWIDTH] IN BLOOD BY AUTOMATED COUNT: 13.2 % (ref 11.5–14.5)
HCT VFR BLD AUTO: 37.3 % (ref 37–48.5)
HGB BLD-MCNC: 11.8 G/DL (ref 12–16)
IMM GRANULOCYTES # BLD AUTO: 0.03 K/UL (ref 0–0.04)
IMM GRANULOCYTES NFR BLD AUTO: 0.4 % (ref 0–0.5)
LYMPHOCYTES # BLD AUTO: 2.9 K/UL (ref 1–4.8)
LYMPHOCYTES NFR BLD: 35.7 % (ref 18–48)
MCH RBC QN AUTO: 28.6 PG (ref 27–31)
MCHC RBC AUTO-ENTMCNC: 31.6 G/DL (ref 32–36)
MCV RBC AUTO: 91 FL (ref 82–98)
MONOCYTES # BLD AUTO: 0.8 K/UL (ref 0.3–1)
MONOCYTES NFR BLD: 9.4 % (ref 4–15)
NEUTROPHILS # BLD AUTO: 4.1 K/UL (ref 1.8–7.7)
NEUTROPHILS NFR BLD: 51.2 % (ref 38–73)
NRBC BLD-RTO: 0 /100 WBC
PLATELET # BLD AUTO: 387 K/UL (ref 150–450)
PMV BLD AUTO: 10.5 FL (ref 9.2–12.9)
RBC # BLD AUTO: 4.12 M/UL (ref 4–5.4)
WBC # BLD AUTO: 8.02 K/UL (ref 3.9–12.7)

## 2024-05-06 PROCEDURE — 86901 BLOOD TYPING SEROLOGIC RH(D): CPT | Performed by: OBSTETRICS & GYNECOLOGY

## 2024-05-06 PROCEDURE — 36415 COLL VENOUS BLD VENIPUNCTURE: CPT | Performed by: OBSTETRICS & GYNECOLOGY

## 2024-05-06 PROCEDURE — 85025 COMPLETE CBC W/AUTO DIFF WBC: CPT | Performed by: OBSTETRICS & GYNECOLOGY

## 2024-05-07 ENCOUNTER — HOSPITAL ENCOUNTER (INPATIENT)
Facility: HOSPITAL | Age: 39
LOS: 1 days | Discharge: HOME | DRG: 743 | End: 2024-05-08
Attending: OBSTETRICS & GYNECOLOGY | Admitting: OBSTETRICS & GYNECOLOGY
Payer: COMMERCIAL

## 2024-05-07 ENCOUNTER — ANESTHESIA (OUTPATIENT)
Dept: SURGERY | Facility: HOSPITAL | Age: 39
DRG: 743 | End: 2024-05-07
Payer: COMMERCIAL

## 2024-05-07 ENCOUNTER — ANESTHESIA EVENT (OUTPATIENT)
Dept: SURGERY | Facility: HOSPITAL | Age: 39
DRG: 743 | End: 2024-05-07
Payer: COMMERCIAL

## 2024-05-07 DIAGNOSIS — Z98.890 S/P MYOMECTOMY: Primary | ICD-10-CM

## 2024-05-07 DIAGNOSIS — D25.1 FIBROIDS, INTRAMURAL: ICD-10-CM

## 2024-05-07 LAB
ABO + RH BLD: NORMAL
BLD GP AB SCN CELLS X3 SERPL QL: NORMAL
POCT GLUCOSE: 84 MG/DL (ref 70–110)
SPECIMEN OUTDATE: NORMAL

## 2024-05-07 PROCEDURE — 64461 PVB THORACIC SINGLE INJ SITE: CPT | Performed by: ANESTHESIOLOGY

## 2024-05-07 PROCEDURE — 86901 BLOOD TYPING SEROLOGIC RH(D): CPT | Performed by: OBSTETRICS & GYNECOLOGY

## 2024-05-07 PROCEDURE — 25000003 PHARM REV CODE 250: Performed by: NURSE ANESTHETIST, CERTIFIED REGISTERED

## 2024-05-07 PROCEDURE — 36415 COLL VENOUS BLD VENIPUNCTURE: CPT | Performed by: OBSTETRICS & GYNECOLOGY

## 2024-05-07 PROCEDURE — D9220A PRA ANESTHESIA: Mod: ANES,,, | Performed by: ANESTHESIOLOGY

## 2024-05-07 PROCEDURE — 88305 TISSUE EXAM BY PATHOLOGIST: CPT | Performed by: PATHOLOGY

## 2024-05-07 PROCEDURE — 63600175 PHARM REV CODE 636 W HCPCS: Performed by: OBSTETRICS & GYNECOLOGY

## 2024-05-07 PROCEDURE — 0UB90ZZ EXCISION OF UTERUS, OPEN APPROACH: ICD-10-PCS | Performed by: OBSTETRICS & GYNECOLOGY

## 2024-05-07 PROCEDURE — C1765 ADHESION BARRIER: HCPCS | Performed by: OBSTETRICS & GYNECOLOGY

## 2024-05-07 PROCEDURE — 11000001 HC ACUTE MED/SURG PRIVATE ROOM

## 2024-05-07 PROCEDURE — 36000707: Performed by: OBSTETRICS & GYNECOLOGY

## 2024-05-07 PROCEDURE — 71000033 HC RECOVERY, INTIAL HOUR: Performed by: OBSTETRICS & GYNECOLOGY

## 2024-05-07 PROCEDURE — 25000003 PHARM REV CODE 250: Performed by: OBSTETRICS & GYNECOLOGY

## 2024-05-07 PROCEDURE — 25000003 PHARM REV CODE 250: Performed by: ANESTHESIOLOGY

## 2024-05-07 PROCEDURE — 36000706: Performed by: OBSTETRICS & GYNECOLOGY

## 2024-05-07 PROCEDURE — 37000009 HC ANESTHESIA EA ADD 15 MINS: Performed by: OBSTETRICS & GYNECOLOGY

## 2024-05-07 PROCEDURE — 63600175 PHARM REV CODE 636 W HCPCS: Performed by: NURSE ANESTHETIST, CERTIFIED REGISTERED

## 2024-05-07 PROCEDURE — 63600175 PHARM REV CODE 636 W HCPCS: Performed by: ANESTHESIOLOGY

## 2024-05-07 PROCEDURE — 71000039 HC RECOVERY, EACH ADD'L HOUR: Performed by: OBSTETRICS & GYNECOLOGY

## 2024-05-07 PROCEDURE — 37000008 HC ANESTHESIA 1ST 15 MINUTES: Performed by: OBSTETRICS & GYNECOLOGY

## 2024-05-07 PROCEDURE — D9220A PRA ANESTHESIA: Mod: CRNA,,, | Performed by: NURSE ANESTHETIST, CERTIFIED REGISTERED

## 2024-05-07 PROCEDURE — 88305 TISSUE EXAM BY PATHOLOGIST: CPT | Mod: 26,,, | Performed by: PATHOLOGY

## 2024-05-07 PROCEDURE — 27201423 OPTIME MED/SURG SUP & DEVICES STERILE SUPPLY: Performed by: OBSTETRICS & GYNECOLOGY

## 2024-05-07 PROCEDURE — 76942 ECHO GUIDE FOR BIOPSY: CPT | Performed by: ANESTHESIOLOGY

## 2024-05-07 PROCEDURE — C9290 INJ, BUPIVACAINE LIPOSOME: HCPCS | Performed by: ANESTHESIOLOGY

## 2024-05-07 PROCEDURE — 58146 MYOMECTOMY ABDOM COMPLEX: CPT | Mod: ,,, | Performed by: OBSTETRICS & GYNECOLOGY

## 2024-05-07 DEVICE — BARRIER SEPRAFILM ADHESION: Type: IMPLANTABLE DEVICE | Site: UTERUS | Status: FUNCTIONAL

## 2024-05-07 RX ORDER — MUPIROCIN 20 MG/G
OINTMENT TOPICAL
Status: COMPLETED | OUTPATIENT
Start: 2024-05-07 | End: 2024-05-07

## 2024-05-07 RX ORDER — MIDAZOLAM HYDROCHLORIDE 1 MG/ML
INJECTION, SOLUTION INTRAMUSCULAR; INTRAVENOUS
Status: DISCONTINUED | OUTPATIENT
Start: 2024-05-07 | End: 2024-05-07

## 2024-05-07 RX ORDER — CELECOXIB 100 MG/1
400 CAPSULE ORAL
Status: COMPLETED | OUTPATIENT
Start: 2024-05-07 | End: 2024-05-07

## 2024-05-07 RX ORDER — HYDROCODONE BITARTRATE AND ACETAMINOPHEN 5; 325 MG/1; MG/1
1 TABLET ORAL EVERY 6 HOURS PRN
Qty: 20 TABLET | Refills: 0 | Status: SHIPPED | OUTPATIENT
Start: 2024-05-07

## 2024-05-07 RX ORDER — MEPERIDINE HYDROCHLORIDE 50 MG/ML
12.5 INJECTION INTRAMUSCULAR; INTRAVENOUS; SUBCUTANEOUS ONCE AS NEEDED
Status: DISCONTINUED | OUTPATIENT
Start: 2024-05-07 | End: 2024-05-07 | Stop reason: HOSPADM

## 2024-05-07 RX ORDER — IBUPROFEN 400 MG/1
800 TABLET ORAL
Status: DISCONTINUED | OUTPATIENT
Start: 2024-05-08 | End: 2024-05-08 | Stop reason: HOSPADM

## 2024-05-07 RX ORDER — PROPOFOL 10 MG/ML
VIAL (ML) INTRAVENOUS
Status: DISCONTINUED | OUTPATIENT
Start: 2024-05-07 | End: 2024-05-07

## 2024-05-07 RX ORDER — ROCURONIUM BROMIDE 10 MG/ML
INJECTION, SOLUTION INTRAVENOUS
Status: DISCONTINUED | OUTPATIENT
Start: 2024-05-07 | End: 2024-05-07

## 2024-05-07 RX ORDER — ONDANSETRON HYDROCHLORIDE 2 MG/ML
4 INJECTION, SOLUTION INTRAVENOUS DAILY PRN
Status: DISCONTINUED | OUTPATIENT
Start: 2024-05-07 | End: 2024-05-07 | Stop reason: HOSPADM

## 2024-05-07 RX ORDER — HYDROCODONE BITARTRATE AND ACETAMINOPHEN 5; 325 MG/1; MG/1
1 TABLET ORAL EVERY 4 HOURS PRN
Status: DISCONTINUED | OUTPATIENT
Start: 2024-05-07 | End: 2024-05-07 | Stop reason: HOSPADM

## 2024-05-07 RX ORDER — FAMOTIDINE 20 MG/1
20 TABLET, FILM COATED ORAL
Status: COMPLETED | OUTPATIENT
Start: 2024-05-07 | End: 2024-05-07

## 2024-05-07 RX ORDER — BUPIVACAINE HYDROCHLORIDE 2.5 MG/ML
INJECTION, SOLUTION EPIDURAL; INFILTRATION; INTRACAUDAL
Status: COMPLETED | OUTPATIENT
Start: 2024-05-07 | End: 2024-05-07

## 2024-05-07 RX ORDER — VASOPRESSIN 20 [USP'U]/ML
INJECTION, SOLUTION INTRAMUSCULAR; SUBCUTANEOUS
Status: DISCONTINUED | OUTPATIENT
Start: 2024-05-07 | End: 2024-05-07 | Stop reason: HOSPADM

## 2024-05-07 RX ORDER — HYDROCODONE BITARTRATE AND ACETAMINOPHEN 10; 325 MG/1; MG/1
1 TABLET ORAL EVERY 4 HOURS PRN
Status: DISCONTINUED | OUTPATIENT
Start: 2024-05-07 | End: 2024-05-08 | Stop reason: HOSPADM

## 2024-05-07 RX ORDER — FAMOTIDINE 10 MG/ML
INJECTION INTRAVENOUS
Status: DISCONTINUED | OUTPATIENT
Start: 2024-05-07 | End: 2024-05-07

## 2024-05-07 RX ORDER — ONDANSETRON 4 MG/1
4 TABLET, FILM COATED ORAL EVERY 8 HOURS PRN
Qty: 20 TABLET | Refills: 0 | Status: SHIPPED | OUTPATIENT
Start: 2024-05-07

## 2024-05-07 RX ORDER — HYDROMORPHONE HYDROCHLORIDE 2 MG/ML
1 INJECTION, SOLUTION INTRAMUSCULAR; INTRAVENOUS; SUBCUTANEOUS EVERY 4 HOURS PRN
Status: DISCONTINUED | OUTPATIENT
Start: 2024-05-07 | End: 2024-05-08 | Stop reason: HOSPADM

## 2024-05-07 RX ORDER — HYDROCODONE BITARTRATE AND ACETAMINOPHEN 5; 325 MG/1; MG/1
1 TABLET ORAL EVERY 4 HOURS PRN
Status: DISCONTINUED | OUTPATIENT
Start: 2024-05-07 | End: 2024-05-08 | Stop reason: HOSPADM

## 2024-05-07 RX ORDER — ACETAMINOPHEN 325 MG/1
650 TABLET ORAL EVERY 4 HOURS PRN
Status: DISCONTINUED | OUTPATIENT
Start: 2024-05-07 | End: 2024-05-08 | Stop reason: HOSPADM

## 2024-05-07 RX ORDER — LIDOCAINE HYDROCHLORIDE 10 MG/ML
INJECTION, SOLUTION INTRAVENOUS
Status: DISCONTINUED | OUTPATIENT
Start: 2024-05-07 | End: 2024-05-07

## 2024-05-07 RX ORDER — DIPHENHYDRAMINE HCL 25 MG
25 CAPSULE ORAL EVERY 4 HOURS PRN
Status: DISCONTINUED | OUTPATIENT
Start: 2024-05-07 | End: 2024-05-08 | Stop reason: HOSPADM

## 2024-05-07 RX ORDER — DEXAMETHASONE SODIUM PHOSPHATE 4 MG/ML
INJECTION, SOLUTION INTRA-ARTICULAR; INTRALESIONAL; INTRAMUSCULAR; INTRAVENOUS; SOFT TISSUE
Status: DISCONTINUED | OUTPATIENT
Start: 2024-05-07 | End: 2024-05-07

## 2024-05-07 RX ORDER — IBUPROFEN 800 MG/1
800 TABLET ORAL EVERY 8 HOURS PRN
Qty: 30 TABLET | Refills: 0 | Status: SHIPPED | OUTPATIENT
Start: 2024-05-07

## 2024-05-07 RX ORDER — AMOXICILLIN 250 MG
1 CAPSULE ORAL 2 TIMES DAILY
Status: DISCONTINUED | OUTPATIENT
Start: 2024-05-07 | End: 2024-05-08 | Stop reason: HOSPADM

## 2024-05-07 RX ORDER — LIDOCAINE HYDROCHLORIDE 10 MG/ML
0.5 INJECTION, SOLUTION EPIDURAL; INFILTRATION; INTRACAUDAL; PERINEURAL
Status: DISCONTINUED | OUTPATIENT
Start: 2024-05-07 | End: 2024-05-07 | Stop reason: HOSPADM

## 2024-05-07 RX ORDER — KETOROLAC TROMETHAMINE 30 MG/ML
30 INJECTION, SOLUTION INTRAMUSCULAR; INTRAVENOUS
Status: COMPLETED | OUTPATIENT
Start: 2024-05-07 | End: 2024-05-08

## 2024-05-07 RX ORDER — FENTANYL CITRATE 50 UG/ML
INJECTION, SOLUTION INTRAMUSCULAR; INTRAVENOUS
Status: DISCONTINUED | OUTPATIENT
Start: 2024-05-07 | End: 2024-05-07

## 2024-05-07 RX ORDER — PROCHLORPERAZINE EDISYLATE 5 MG/ML
5 INJECTION INTRAMUSCULAR; INTRAVENOUS EVERY 10 MIN PRN
Status: DISCONTINUED | OUTPATIENT
Start: 2024-05-07 | End: 2024-05-07 | Stop reason: HOSPADM

## 2024-05-07 RX ORDER — DIPHENHYDRAMINE HYDROCHLORIDE 50 MG/ML
25 INJECTION INTRAMUSCULAR; INTRAVENOUS EVERY 4 HOURS PRN
Status: DISCONTINUED | OUTPATIENT
Start: 2024-05-07 | End: 2024-05-08 | Stop reason: HOSPADM

## 2024-05-07 RX ORDER — HYDROMORPHONE HYDROCHLORIDE 2 MG/ML
0.2 INJECTION, SOLUTION INTRAMUSCULAR; INTRAVENOUS; SUBCUTANEOUS EVERY 5 MIN PRN
Status: DISCONTINUED | OUTPATIENT
Start: 2024-05-07 | End: 2024-05-07 | Stop reason: HOSPADM

## 2024-05-07 RX ORDER — PREGABALIN 50 MG/1
50 CAPSULE ORAL
Status: COMPLETED | OUTPATIENT
Start: 2024-05-07 | End: 2024-05-07

## 2024-05-07 RX ORDER — SODIUM CHLORIDE, SODIUM LACTATE, POTASSIUM CHLORIDE, CALCIUM CHLORIDE 600; 310; 30; 20 MG/100ML; MG/100ML; MG/100ML; MG/100ML
INJECTION, SOLUTION INTRAVENOUS CONTINUOUS
Status: DISCONTINUED | OUTPATIENT
Start: 2024-05-07 | End: 2024-05-08 | Stop reason: HOSPADM

## 2024-05-07 RX ORDER — SCOLOPAMINE TRANSDERMAL SYSTEM 1 MG/1
1 PATCH, EXTENDED RELEASE TRANSDERMAL
Status: DISCONTINUED | OUTPATIENT
Start: 2024-05-07 | End: 2024-05-08 | Stop reason: HOSPADM

## 2024-05-07 RX ORDER — ONDANSETRON 8 MG/1
8 TABLET, ORALLY DISINTEGRATING ORAL EVERY 8 HOURS PRN
Status: DISCONTINUED | OUTPATIENT
Start: 2024-05-07 | End: 2024-05-08 | Stop reason: HOSPADM

## 2024-05-07 RX ORDER — CEFAZOLIN SODIUM 2 G/50ML
2 SOLUTION INTRAVENOUS
Status: DISCONTINUED | OUTPATIENT
Start: 2024-05-07 | End: 2024-05-07 | Stop reason: HOSPADM

## 2024-05-07 RX ORDER — SODIUM CHLORIDE 0.9 % (FLUSH) 0.9 %
3 SYRINGE (ML) INJECTION
Status: DISCONTINUED | OUTPATIENT
Start: 2024-05-07 | End: 2024-05-07 | Stop reason: HOSPADM

## 2024-05-07 RX ORDER — ACETAMINOPHEN 500 MG
1000 TABLET ORAL
Status: COMPLETED | OUTPATIENT
Start: 2024-05-07 | End: 2024-05-07

## 2024-05-07 RX ORDER — PROCHLORPERAZINE EDISYLATE 5 MG/ML
5 INJECTION INTRAMUSCULAR; INTRAVENOUS EVERY 6 HOURS PRN
Status: DISCONTINUED | OUTPATIENT
Start: 2024-05-07 | End: 2024-05-08 | Stop reason: HOSPADM

## 2024-05-07 RX ORDER — ONDANSETRON HYDROCHLORIDE 2 MG/ML
INJECTION, SOLUTION INTRAVENOUS
Status: DISCONTINUED | OUTPATIENT
Start: 2024-05-07 | End: 2024-05-07

## 2024-05-07 RX ORDER — POLYETHYLENE GLYCOL 3350 17 G/17G
17 POWDER, FOR SOLUTION ORAL DAILY
Status: DISCONTINUED | OUTPATIENT
Start: 2024-05-07 | End: 2024-05-08 | Stop reason: HOSPADM

## 2024-05-07 RX ADMIN — ROCURONIUM BROMIDE 50 MG: 10 INJECTION, SOLUTION INTRAVENOUS at 10:05

## 2024-05-07 RX ADMIN — ROCURONIUM BROMIDE 20 MG: 10 INJECTION, SOLUTION INTRAVENOUS at 10:05

## 2024-05-07 RX ADMIN — SUGAMMADEX 200 MG: 100 INJECTION, SOLUTION INTRAVENOUS at 12:05

## 2024-05-07 RX ADMIN — KETOROLAC TROMETHAMINE 30 MG: 30 INJECTION, SOLUTION INTRAMUSCULAR; INTRAVENOUS at 02:05

## 2024-05-07 RX ADMIN — BUPIVACAINE HYDROCHLORIDE 20 ML: 2.5 INJECTION, SOLUTION EPIDURAL; INFILTRATION; INTRACAUDAL; PERINEURAL at 10:05

## 2024-05-07 RX ADMIN — PREGABALIN 50 MG: 50 CAPSULE ORAL at 09:05

## 2024-05-07 RX ADMIN — FAMOTIDINE 20 MG: 20 TABLET ORAL at 09:05

## 2024-05-07 RX ADMIN — MIDAZOLAM 2 MG: 1 INJECTION INTRAMUSCULAR; INTRAVENOUS at 10:05

## 2024-05-07 RX ADMIN — DEXAMETHASONE SODIUM PHOSPHATE 8 MG: 4 INJECTION, SOLUTION INTRA-ARTICULAR; INTRALESIONAL; INTRAMUSCULAR; INTRAVENOUS; SOFT TISSUE at 10:05

## 2024-05-07 RX ADMIN — MUPIROCIN: 20 OINTMENT TOPICAL at 09:05

## 2024-05-07 RX ADMIN — FENTANYL CITRATE 50 MCG: 50 INJECTION INTRAMUSCULAR; INTRAVENOUS at 10:05

## 2024-05-07 RX ADMIN — BUPIVACAINE 20 ML: 13.3 INJECTION, SUSPENSION, LIPOSOMAL INFILTRATION at 10:05

## 2024-05-07 RX ADMIN — MIDAZOLAM 2 MG: 1 INJECTION INTRAMUSCULAR; INTRAVENOUS at 09:05

## 2024-05-07 RX ADMIN — FENTANYL CITRATE 100 MCG: 50 INJECTION INTRAMUSCULAR; INTRAVENOUS at 10:05

## 2024-05-07 RX ADMIN — SCOPALAMINE 1 PATCH: 1 PATCH, EXTENDED RELEASE TRANSDERMAL at 09:05

## 2024-05-07 RX ADMIN — HYDROCODONE BITARTRATE AND ACETAMINOPHEN 1 TABLET: 5; 325 TABLET ORAL at 01:05

## 2024-05-07 RX ADMIN — HYDROMORPHONE HYDROCHLORIDE 0.2 MG: 2 INJECTION, SOLUTION INTRAMUSCULAR; INTRAVENOUS; SUBCUTANEOUS at 12:05

## 2024-05-07 RX ADMIN — SODIUM CHLORIDE, POTASSIUM CHLORIDE, SODIUM LACTATE AND CALCIUM CHLORIDE: 600; 310; 30; 20 INJECTION, SOLUTION INTRAVENOUS at 03:05

## 2024-05-07 RX ADMIN — ONDANSETRON 4 MG: 2 INJECTION, SOLUTION INTRAMUSCULAR; INTRAVENOUS at 12:05

## 2024-05-07 RX ADMIN — CELECOXIB 400 MG: 100 CAPSULE ORAL at 09:05

## 2024-05-07 RX ADMIN — KETOROLAC TROMETHAMINE 30 MG: 30 INJECTION, SOLUTION INTRAMUSCULAR; INTRAVENOUS at 07:05

## 2024-05-07 RX ADMIN — SODIUM CHLORIDE: 0.9 INJECTION, SOLUTION INTRAVENOUS at 10:05

## 2024-05-07 RX ADMIN — FAMOTIDINE 20 MG: 10 INJECTION, SOLUTION INTRAVENOUS at 10:05

## 2024-05-07 RX ADMIN — HYDROCODONE BITARTRATE AND ACETAMINOPHEN 1 TABLET: 10; 325 TABLET ORAL at 06:05

## 2024-05-07 RX ADMIN — DEXTROSE 2 G: 50 INJECTION, SOLUTION INTRAVENOUS at 10:05

## 2024-05-07 RX ADMIN — LIDOCAINE HYDROCHLORIDE 50 MG: 10 INJECTION, SOLUTION INTRAVENOUS at 10:05

## 2024-05-07 RX ADMIN — HYDROMORPHONE HYDROCHLORIDE 0.2 MG: 2 INJECTION, SOLUTION INTRAMUSCULAR; INTRAVENOUS; SUBCUTANEOUS at 01:05

## 2024-05-07 RX ADMIN — ONDANSETRON 4 MG: 2 INJECTION, SOLUTION INTRAMUSCULAR; INTRAVENOUS at 10:05

## 2024-05-07 RX ADMIN — LIDOCAINE HYDROCHLORIDE 100 MG: 10 INJECTION, SOLUTION INTRAVENOUS at 10:05

## 2024-05-07 RX ADMIN — ACETAMINOPHEN 1000 MG: 500 TABLET ORAL at 09:05

## 2024-05-07 RX ADMIN — PROPOFOL 200 MG: 10 INJECTION, EMULSION INTRAVENOUS at 10:05

## 2024-05-07 NOTE — OP NOTE
Surgery Date: 05/07/2024     SURGEON: Yvette Castellanos    ASSISTANT: Minda Hollins    PREOP DIAGNOSIS:   Fibroids  Menorrhagia  Pelvic pain    POSTOP DIAGNOSIS:    1. Fibroids  2. Menorrhagia  3. Pelvic pain    PROCEDURES: Abdominal myomectomy via mini-laparotomy (27 fibroids)      ANESTHESIA: general/regional    FINDINGS/KEY COMPONENTS: Uterus approximately 18 week size. Multiple fibroids - pedunculated, degenerating fibroid, large posterior intramural fibroid, multiple smaller intramural and subserosal fibroids - one intramural fibroid impinging on the cavity. Normal tubes and ovaries bilaterally. Patient is NOT a candidate for a vaginal delivery.     ESTIMATED BLOOD LOSS: 120 cc    COMPLICATIONS: None    IV FLUIDS: 2100 cc    URINE OUTPUT: 250 cc    PROCEDURE: Patient was taking to the operating room where general anesthesia was administered and found to be adequate.  She was prepped and draped in the dorsal supine position. A burger catheter was inserted into the bladder.    Gloves were changed.  A Pfannenstiel skin incision was made with the scalpel and carried down to the underlying layer of fascia with the scalpel.  The incision was extended laterally with Dorado scissors.  The superior aspect of the incision was grasped with the [facility]ner clamps, tented up and the underlying muscles were dissected off bluntly.  Attention was turned to the inferior aspect of the incision.  The underlying muscles were dissected off bluntly.  The rectus muscles were  in the midline.  The peritoneum was entered in digitally.  The incision was extended with finger fracture.  All laparotomy sponges were removed from the surgical field.     The uterus was delivered through the incision. A malleable was placed posteriorly. A red rubber catheter was placed around the lower uterine segment. The above findings were noted.  All fibroids were removed in the following fashion: 20 Units of Pitressin diluted in 100cc of Normal  saline was injected into the serosa overlying the fibroid.  An incision was made with a Bovie.  This incision was carried down to the fibroid with the Bovie.  The fibroid was grasped with a Kenroy clamp.  Using the handheld Ligasure, the fibroid was enucleated from the underlying myometrium.  Hemostasis was maintained utilizing the Ligasure.  Once the fibroid was completely enucleated, it was handed to the waiting surgical nurse.  The deepest layer of myometrium was reapproximated with 2-0 PDS in a running, locked fashion.  The next layer of myometrium was reapproximated with 2-0 Vicryl in a running, locked fashion.  The serosa was reapproximated with 3-0 Monocryl in a baseball stitch fashion.  Excellent hemostasis was noted.      The red rubber catheter was removed. The uterus was returned to the abdomen. The uterus and abdomen were copiously irrigated.  Excellent hemostasis was noted.  A sheet of seprafilm was placed inside the abdominal cavity.  The muscle was reapproximated with 2-0 Vicryl.  The fascia was reapproximated with 1-0 PDS in a running fashion.  The subcutaneous tissue was reapproximated with 2-0 Vicryl in a running fashion.  The skin was closed with 4-0 Monocryl in a subcuticular fashion. Sponge, lap, and needle counts were correct x 2.  The patient was taken to the recovery room in stable condition with the burger draining clear urine.

## 2024-05-07 NOTE — ANESTHESIA POSTPROCEDURE EVALUATION
Anesthesia Post Evaluation    Patient: Saad Ayon    Procedure(s) Performed: Procedure(s) (LRB):  ABDOMINAL MYOMECTOMY (N/A)    Final Anesthesia Type: general      Patient location during evaluation: PACU  Patient participation: Yes- Able to Participate  Level of consciousness: awake and alert  Post-procedure vital signs: reviewed and stable  Pain management: adequate  Airway patency: patent    PONV status at discharge: No PONV  Anesthetic complications: no      Cardiovascular status: blood pressure returned to baseline  Respiratory status: unassisted  Hydration status: euvolemic                Vitals Value Taken Time   /78 05/07/24 1534   Temp 36.9 °C (98.4 °F) 05/07/24 1534   Pulse 77 05/07/24 1534   Resp 16 05/07/24 1534   SpO2 95 % 05/07/24 1534         Event Time   Out of Recovery 14:55:00         Pain/Verónica Score: Pain Rating Prior to Med Admin: 7 (5/7/2024  2:20 PM)  Verónica Score: 9 (5/7/2024  2:50 PM)

## 2024-05-07 NOTE — ANESTHESIA PROCEDURE NOTES
Intubation    Date/Time: 5/7/2024 10:15 AM    Performed by: John Charlton CRNA  Authorized by: John Charlton CRNA    Intubation:     Induction:  Intravenous    Mask Ventilation:  Easy mask    Attempts:  1    Attempted By:  CRNA    Method of Intubation:  Video laryngoscopy    Laryngeal View Grade: Grade I - full view of cords      Difficult Airway Encountered?: No      Complications:  None    Airway Device:  Oral endotracheal tube    Airway Device Size:  7.0    Style/Cuff Inflation:  Cuffed    Inflation Amount (mL):  5    Tube secured:  21    Secured at:  The lips    Placement Verified By:  Capnometry    Complicating Factors:  None    Findings Post-Intubation:  BS equal bilateral

## 2024-05-07 NOTE — BRIEF OP NOTE
BRIEF OPERATIVE NOTE       SUMMARY      Surgery Date: 05/07/2024     SURGEON: Yvette Castellanos    ASSISTANT: Minda Hollins    PREOP DIAGNOSIS:   Fibroids  Menorrhagia  Pelvic pain    POSTOP DIAGNOSIS:    1. Fibroids  2. Menorrhagia  3. Pelvic pain    PROCEDURES: Abdominal myomectomy via mini-laparotomy (27 fibroids)      ANESTHESIA: general/regional    FINDINGS/KEY COMPONENTS: Uterus approximately 18 week size. Multiple fibroids - pedunculated, degenerating fibroid, large posterior intramural fibroid, multiple smaller intramural and subserosal fibroids - one intramural fibroid impinging on the cavity. Normal tubes and ovaries bilaterally. Patient is NOT a candidate for a vaginal delivery.     ESTIMATED BLOOD LOSS: 120 cc    COMPLICATIONS: None    PATHOLOGY:   Specimens (From admission, onward)       Start     Ordered    05/07/24 1228  Specimen to Pathology, Surgery Gynecology and Obstetrics  Once        Comments: Pre-op Diagnosis: Fibroids, intramural [D25.1]Procedure(s):MYOMECTOMY Number of specimens: 1Name of specimens: 1. Uterine fibroids - Perm     References:    Click here for ordering Quick Tip   Question Answer Comment   Procedure Type: Gynecology and Obstetrics    Specimen Class: Routine/Screening    Release to patient Immediate        05/07/24 6848

## 2024-05-07 NOTE — ANESTHESIA PREPROCEDURE EVALUATION
05/07/2024    Saad Ayon is a 39 y.o., female.    Past Medical History:   Diagnosis Date    Fibroids     Herpes     on bloodwork only in 2010 - never had an outbreak     Past Surgical History:   Procedure Laterality Date    WISDOM TOOTH EXTRACTION             Pre-op Assessment    I have reviewed the Patient Summary Reports.     I have reviewed the Nursing Notes. I have reviewed the NPO Status.      Review of Systems  Anesthesia Hx:   History of prior surgery of interest to airway management or planning:            Denies Personal Hx of Anesthesia complications.                    Cardiovascular:  Exercise tolerance: good                                           Hepatic/GI:  Hepatic/GI Normal                 Endocrine:        Obesity / BMI > 30      Physical Exam  General: Well nourished    Airway:  Mallampati: II   Mouth Opening: Normal  Neck ROM: Normal ROM    Dental:  Intact        Anesthesia Plan  Type of Anesthesia, risks & benefits discussed:    Anesthesia Type: Gen ETT, Regional  Informed Consent: Informed consent signed with the Patient and all parties understand the risks and agree with anesthesia plan.  All questions answered.   ASA Score: 2    Ready For Surgery From Anesthesia Perspective.     .

## 2024-05-07 NOTE — ANESTHESIA PROCEDURE NOTES
Peripheral Block    Patient location during procedure: pre-op   Block not for primary anesthetic.  Reason for block: at surgeon's request and post-op pain management   Post-op Pain Location: Abdominal pain   Start time: 5/7/2024 9:55 AM  Timeout: 5/7/2024 9:53 AM   End time: 5/7/2024 10:02 AM    Staffing  Authorizing Provider: Wale White MD  Performing Provider: Wale White MD    Staffing  Performed by: Wale White MD  Authorized by: Wale White MD    Preanesthetic Checklist  Completed: patient identified, IV checked, site marked, risks and benefits discussed, surgical consent, monitors and equipment checked, pre-op evaluation and timeout performed  Peripheral Block  Patient position: sitting  Prep: ChloraPrep  Patient monitoring: heart rate, cardiac monitor, continuous pulse ox, continuous capnometry and frequent blood pressure checks  Block type: erector spinae plane  Laterality: bilateral  Injection technique: single shot  Interspace: T7-8    Needle  Needle type: Tuohy   Needle gauge: 17 G  Needle length: 3.5 in  Needle localization: anatomical landmarks and ultrasound guidance   -ultrasound image captured on disc.  Assessment  Injection assessment: negative aspiration, negative parasthesia and local visualized surrounding nerve  Paresthesia pain: none  Heart rate change: no  Slow fractionated injection: yes    Medications:    Medications: bupivacaine (pf) (MARCAINE) injection 0.25% - Perineural   20 mL - 5/7/2024 10:00:00 AM    Additional Notes  Patient tolerated well.  [facility]    10 mL of exparel + 10 mL of 0.25% bupivacaine per side.

## 2024-05-07 NOTE — TRANSFER OF CARE
"Anesthesia Transfer of Care Note    Patient: Saad Ayon    Procedure(s) Performed: Procedure(s) (LRB):  ABDOMINAL MYOMECTOMY (N/A)    Patient location: PACU    Transport from OR: Transported from OR on 6-10 L/min O2 by face mask with adequate spontaneous ventilation    Post pain: adequate analgesia    Post assessment: no apparent anesthetic complications    Post vital signs: stable    Level of consciousness: sedated    Nausea/Vomiting: no nausea/vomiting    Complications: none    Transfer of care protocol was followed    Last vitals: Visit Vitals  BP (!) 157/90 (BP Location: Right arm, Patient Position: Lying)   Pulse 88   Temp 36.8 °C (98.2 °F) (Skin)   Resp 17   Ht 5' 9" (1.753 m)   Wt 102.1 kg (225 lb)   SpO2 100%   Breastfeeding No   BMI 33.23 kg/m²     "

## 2024-05-08 ENCOUNTER — TELEPHONE (OUTPATIENT)
Dept: OBSTETRICS AND GYNECOLOGY | Facility: CLINIC | Age: 39
End: 2024-05-08
Payer: COMMERCIAL

## 2024-05-08 VITALS
SYSTOLIC BLOOD PRESSURE: 125 MMHG | HEART RATE: 113 BPM | BODY MASS INDEX: 33.33 KG/M2 | WEIGHT: 225 LBS | TEMPERATURE: 99 F | DIASTOLIC BLOOD PRESSURE: 87 MMHG | OXYGEN SATURATION: 94 % | RESPIRATION RATE: 17 BRPM | HEIGHT: 69 IN

## 2024-05-08 LAB
BASOPHILS # BLD AUTO: 0.02 K/UL (ref 0–0.2)
BASOPHILS NFR BLD: 0.1 % (ref 0–1.9)
DIFFERENTIAL METHOD BLD: ABNORMAL
EOSINOPHIL # BLD AUTO: 0 K/UL (ref 0–0.5)
EOSINOPHIL NFR BLD: 0 % (ref 0–8)
ERYTHROCYTE [DISTWIDTH] IN BLOOD BY AUTOMATED COUNT: 13.5 % (ref 11.5–14.5)
HCT VFR BLD AUTO: 28.7 % (ref 37–48.5)
HGB BLD-MCNC: 9.3 G/DL (ref 12–16)
IMM GRANULOCYTES # BLD AUTO: 0.05 K/UL (ref 0–0.04)
IMM GRANULOCYTES NFR BLD AUTO: 0.3 % (ref 0–0.5)
LYMPHOCYTES # BLD AUTO: 1.6 K/UL (ref 1–4.8)
LYMPHOCYTES NFR BLD: 10.2 % (ref 18–48)
MCH RBC QN AUTO: 28.7 PG (ref 27–31)
MCHC RBC AUTO-ENTMCNC: 32.4 G/DL (ref 32–36)
MCV RBC AUTO: 89 FL (ref 82–98)
MONOCYTES # BLD AUTO: 1.5 K/UL (ref 0.3–1)
MONOCYTES NFR BLD: 9.2 % (ref 4–15)
NEUTROPHILS # BLD AUTO: 12.7 K/UL (ref 1.8–7.7)
NEUTROPHILS NFR BLD: 80.2 % (ref 38–73)
NRBC BLD-RTO: 0 /100 WBC
PLATELET # BLD AUTO: 308 K/UL (ref 150–450)
PMV BLD AUTO: 10.1 FL (ref 9.2–12.9)
RBC # BLD AUTO: 3.24 M/UL (ref 4–5.4)
WBC # BLD AUTO: 15.84 K/UL (ref 3.9–12.7)

## 2024-05-08 PROCEDURE — 25000003 PHARM REV CODE 250: Performed by: STUDENT IN AN ORGANIZED HEALTH CARE EDUCATION/TRAINING PROGRAM

## 2024-05-08 PROCEDURE — 36415 COLL VENOUS BLD VENIPUNCTURE: CPT | Performed by: OBSTETRICS & GYNECOLOGY

## 2024-05-08 PROCEDURE — 85025 COMPLETE CBC W/AUTO DIFF WBC: CPT | Performed by: OBSTETRICS & GYNECOLOGY

## 2024-05-08 PROCEDURE — 25000003 PHARM REV CODE 250: Performed by: OBSTETRICS & GYNECOLOGY

## 2024-05-08 PROCEDURE — 63600175 PHARM REV CODE 636 W HCPCS: Performed by: OBSTETRICS & GYNECOLOGY

## 2024-05-08 RX ORDER — SIMETHICONE 80 MG
80 TABLET,CHEWABLE ORAL EVERY 6 HOURS PRN
Status: DISCONTINUED | OUTPATIENT
Start: 2024-05-08 | End: 2024-05-08 | Stop reason: HOSPADM

## 2024-05-08 RX ADMIN — SIMETHICONE 80 MG: 80 TABLET, CHEWABLE ORAL at 06:05

## 2024-05-08 RX ADMIN — KETOROLAC TROMETHAMINE 30 MG: 30 INJECTION, SOLUTION INTRAMUSCULAR; INTRAVENOUS at 01:05

## 2024-05-08 RX ADMIN — KETOROLAC TROMETHAMINE 30 MG: 30 INJECTION, SOLUTION INTRAMUSCULAR; INTRAVENOUS at 05:05

## 2024-05-08 RX ADMIN — HYDROCODONE BITARTRATE AND ACETAMINOPHEN 1 TABLET: 10; 325 TABLET ORAL at 06:05

## 2024-05-08 RX ADMIN — HYDROCODONE BITARTRATE AND ACETAMINOPHEN 1 TABLET: 10; 325 TABLET ORAL at 12:05

## 2024-05-08 RX ADMIN — IBUPROFEN 800 MG: 400 TABLET ORAL at 12:05

## 2024-05-08 RX ADMIN — SIMETHICONE 80 MG: 80 TABLET, CHEWABLE ORAL at 12:05

## 2024-05-08 RX ADMIN — SENNOSIDES AND DOCUSATE SODIUM 1 TABLET: 8.6; 5 TABLET ORAL at 08:05

## 2024-05-08 NOTE — PLAN OF CARE
Discharge instructions given to patient verbally and in writing. Prescriptions given with explanation for use. Patient was able to verbalize understanding of all instructions. Says she will have assistance when she returns home. Instructed patient to call for wheelchair when ready for discharge.

## 2024-05-08 NOTE — PROGRESS NOTES
POD #1    S: patient having right shoulder pain. Passing a little flatus. Waiting for spontaneous void.    O  Temp:  [97.2 °F (36.2 °C)-98.9 °F (37.2 °C)]   Pulse:  []   Resp:  [13-18]   BP: (114-161)/()   SpO2:  [95 %-100 %]    Gen: A&Ox3, NAD  Abd: soft, min ttp, incision with bandage c/d/i  Ext: no edema    Lab Results   Component Value Date    WBC 8.02 2024    HGB 11.8 (L) 2024    HCT 37.3 2024    MCV 91 2024     2024       B POS    AP: 38 yo  female s/p abdominal myomectomy, doing well.    Regular diet  PO pain meds  Ambulate  Waiting for spontaneous void    Anticipate d/c to home today if patient reaching milestones      DIMITRY toussaint MD

## 2024-05-10 LAB
FINAL PATHOLOGIC DIAGNOSIS: NORMAL
GROSS: NORMAL
Lab: NORMAL

## 2024-05-11 ENCOUNTER — NURSE TRIAGE (OUTPATIENT)
Dept: ADMINISTRATIVE | Facility: CLINIC | Age: 39
End: 2024-05-11
Payer: COMMERCIAL

## 2024-05-12 NOTE — TELEPHONE ENCOUNTER
Patient states she is s/p Myomectomy on 05/07/24. Patient states onset of elevated temperature readings of . Patient denies pain, rash, vomiting and headache.     On Call Provider, Dr. Nael Mireles advised for patient to monitor temperature and alternate between Tylenol and Ibuprofen. Dr. Mireles also advised for patient to Go to ED for temperature readings of >/= 101.5 and to follow up with Dr. Castellanos on next week.     Care Advice per Dr. Nael Mireles provided to patient and patient states understanding. Patient also advised to contact the [facility] Triage Service for any worsening symptoms and/or questions.     Reason for Disposition   [1] Caller has NON-URGENT question AND [2] triager unable to answer question    Additional Information   Negative: Sounds like a life-threatening emergency to the triager   Negative: Chest pain   Negative: Difficulty breathing   Negative: Acting confused (e.g., disoriented, slurred speech) or excessively sleepy   Negative: Post-Op tonsil and adenoid surgery, symptoms or questions about   Negative: Surgical incision symptoms and questions   Negative: [1] Pain or burning with passing urine (urination) AND [2] male   Negative: [1] Pain or burning with passing urine (urination) AND [2] female   Negative: Constipation   Negative: New or worsening leg (calf, thigh) pain   Negative: New or worsening leg swelling   Negative: Dizziness is severe, or persists > 24 hours after surgery   Negative: Pain, redness, swelling, or pus at IV Site   Negative: Symptoms arising from use of a urinary catheter (e.g., Coude, [place])   Negative: Cast problems or questions   Negative: Medication question   Negative: [1] Widespread rash AND [2] bright red, sunburn-like   Negative: [1] SEVERE headache AND [2] after spinal (epidural) anesthesia   Negative: [1] Vomiting AND [2] persists > 4 hours   Negative: [1] Vomiting AND [2] abdomen looks much more swollen than usual   Negative: [1] Drinking very  little AND [2] dehydration suspected (e.g., no urine > 12 hours, very dry mouth, very lightheaded)   Negative: Patient sounds very sick or weak to the triager   Negative: Sounds like a serious complication to the triager   Negative: Fever > 100.4 F (38.0 C)   Negative: [1] SEVERE post-op pain (e.g., excruciating, pain scale 8-10) AND [2] not controlled with pain medications   Negative: [1] Caller has URGENT question AND [2] triager unable to answer question   Negative: [1] Headache AND [2] after spinal (epidural) anesthesia AND [3] not severe   Negative: Fever present > 3 days (72 hours)   Negative: [1] MILD-MODERATE post-op pain (e.g., pain scale 1-7) AND [2] not controlled with pain medications    Protocols used: Post-Op Symptoms and [id number]

## 2024-05-13 ENCOUNTER — PATIENT MESSAGE (OUTPATIENT)
Dept: OBSTETRICS AND GYNECOLOGY | Facility: CLINIC | Age: 39
End: 2024-05-13
Payer: COMMERCIAL

## 2024-05-13 ENCOUNTER — TELEPHONE (OUTPATIENT)
Dept: OBSTETRICS AND GYNECOLOGY | Facility: CLINIC | Age: 39
End: 2024-05-13
Payer: COMMERCIAL

## 2024-05-13 NOTE — TELEPHONE ENCOUNTER
I reviewed the patient's latest note and pictures that she provided online.  Pix show abdomen with ecchymoses all over the anterior portion on the left side.  Patient reports low grade fevers but has not gotten over 100.  Reports that area with bruising is tender to touch.  Denies feeling weak.    Per picture and bruising, I wonder if there was a subQ blood vessel bleeding that has now tamponaded itself.    Patient has first postop visit tomorrow.    She is encouraged to come to that visit UNLESS her symptoms worsen. In that case, she should go straight to the ED for evaluation.    DIMITRY toussaint mD

## 2024-05-14 ENCOUNTER — LAB VISIT (OUTPATIENT)
Dept: LAB | Facility: HOSPITAL | Age: 39
End: 2024-05-14
Attending: FAMILY MEDICINE
Payer: COMMERCIAL

## 2024-05-14 ENCOUNTER — OFFICE VISIT (OUTPATIENT)
Dept: OBSTETRICS AND GYNECOLOGY | Facility: CLINIC | Age: 39
End: 2024-05-14
Payer: COMMERCIAL

## 2024-05-14 VITALS
BODY MASS INDEX: 33.47 KG/M2 | WEIGHT: 226.63 LBS | SYSTOLIC BLOOD PRESSURE: 120 MMHG | DIASTOLIC BLOOD PRESSURE: 77 MMHG

## 2024-05-14 DIAGNOSIS — R10.9 ABDOMINAL PAIN, UNSPECIFIED ABDOMINAL LOCATION: Primary | ICD-10-CM

## 2024-05-14 DIAGNOSIS — T14.8XXA BRUISING: ICD-10-CM

## 2024-05-14 LAB
APTT PPP: 30.1 SEC (ref 21–32)
BASOPHILS # BLD AUTO: 0.06 K/UL (ref 0–0.2)
BASOPHILS NFR BLD: 0.4 % (ref 0–1.9)
DIFFERENTIAL METHOD BLD: ABNORMAL
EOSINOPHIL # BLD AUTO: 0.3 K/UL (ref 0–0.5)
EOSINOPHIL NFR BLD: 1.9 % (ref 0–8)
ERYTHROCYTE [DISTWIDTH] IN BLOOD BY AUTOMATED COUNT: 13.9 % (ref 11.5–14.5)
FIBRINOGEN PPP-MCNC: 661 MG/DL (ref 182–400)
HCT VFR BLD AUTO: 26.3 % (ref 37–48.5)
HGB BLD-MCNC: 8.4 G/DL (ref 12–16)
IMM GRANULOCYTES # BLD AUTO: 0.09 K/UL (ref 0–0.04)
IMM GRANULOCYTES NFR BLD AUTO: 0.6 % (ref 0–0.5)
LYMPHOCYTES # BLD AUTO: 2.8 K/UL (ref 1–4.8)
LYMPHOCYTES NFR BLD: 19.5 % (ref 18–48)
MCH RBC QN AUTO: 28.8 PG (ref 27–31)
MCHC RBC AUTO-ENTMCNC: 31.9 G/DL (ref 32–36)
MCV RBC AUTO: 90 FL (ref 82–98)
MONOCYTES # BLD AUTO: 1.2 K/UL (ref 0.3–1)
MONOCYTES NFR BLD: 8.7 % (ref 4–15)
NEUTROPHILS # BLD AUTO: 9.8 K/UL (ref 1.8–7.7)
NEUTROPHILS NFR BLD: 68.9 % (ref 38–73)
NRBC BLD-RTO: 0 /100 WBC
PLATELET # BLD AUTO: 477 K/UL (ref 150–450)
PMV BLD AUTO: 9.5 FL (ref 9.2–12.9)
RBC # BLD AUTO: 2.92 M/UL (ref 4–5.4)
WBC # BLD AUTO: 14.28 K/UL (ref 3.9–12.7)

## 2024-05-14 PROCEDURE — 1159F MED LIST DOCD IN RCRD: CPT | Mod: CPTII,S$GLB,, | Performed by: OBSTETRICS & GYNECOLOGY

## 2024-05-14 PROCEDURE — 3078F DIAST BP <80 MM HG: CPT | Mod: CPTII,S$GLB,, | Performed by: OBSTETRICS & GYNECOLOGY

## 2024-05-14 PROCEDURE — 85730 THROMBOPLASTIN TIME PARTIAL: CPT | Performed by: OBSTETRICS & GYNECOLOGY

## 2024-05-14 PROCEDURE — 3074F SYST BP LT 130 MM HG: CPT | Mod: CPTII,S$GLB,, | Performed by: OBSTETRICS & GYNECOLOGY

## 2024-05-14 PROCEDURE — 99999 PR PBB SHADOW E&M-EST. PATIENT-LVL III: CPT | Mod: PBBFAC,,, | Performed by: OBSTETRICS & GYNECOLOGY

## 2024-05-14 PROCEDURE — 85025 COMPLETE CBC W/AUTO DIFF WBC: CPT | Performed by: OBSTETRICS & GYNECOLOGY

## 2024-05-14 PROCEDURE — 36415 COLL VENOUS BLD VENIPUNCTURE: CPT | Performed by: OBSTETRICS & GYNECOLOGY

## 2024-05-14 PROCEDURE — 99024 POSTOP FOLLOW-UP VISIT: CPT | Mod: S$GLB,,, | Performed by: OBSTETRICS & GYNECOLOGY

## 2024-05-14 PROCEDURE — 85384 FIBRINOGEN ACTIVITY: CPT | Performed by: OBSTETRICS & GYNECOLOGY

## 2024-05-14 NOTE — PROGRESS NOTES
CC: s/p myomectomy     HPI:   Saad Ayon is a 39 y.o. here for f/u  open myomectomy  on 5/7/24. She reports normal bowel movements and urination. Pain is controlled with OTC medications.  She started getting worsening bruising on Saturday, seems stable since then. Having BM and urinating well. VB is light, could use one pad per day.     Vitals:    05/14/24 1435   BP: 120/77       PHYSICAL EXAM:   APPEARANCE: Well nourished, well developed, in no acute distress.  ABDOMEN: Soft. Extensive ecchymosis from under umbilicus to about 1 cm above incision to the flank area. Area under the panus is slightly firm.  No hernias. well healed Pfannenstiel incision, no opening and no drainage.   PELVIC: deferred    Pathology: benign     1. Abdominal pain, unspecified abdominal location    2. Bruising          PLAN:   1. Suspect this is just extensive bruising however there is a firm area on the panus, could be edema but will get CT scan to r/o hematoma. Will get labs today as well. Discussed precautions. Overall healing well with no signs of infection. Discussed slowly increasing activity.

## 2024-05-15 ENCOUNTER — PATIENT MESSAGE (OUTPATIENT)
Dept: OBSTETRICS AND GYNECOLOGY | Facility: CLINIC | Age: 39
End: 2024-05-15
Payer: COMMERCIAL

## 2024-05-16 ENCOUNTER — PATIENT MESSAGE (OUTPATIENT)
Dept: OBSTETRICS AND GYNECOLOGY | Facility: CLINIC | Age: 39
End: 2024-05-16
Payer: COMMERCIAL

## 2024-05-16 ENCOUNTER — HOSPITAL ENCOUNTER (OUTPATIENT)
Dept: RADIOLOGY | Facility: HOSPITAL | Age: 39
Discharge: HOME | End: 2024-05-16
Attending: OBSTETRICS & GYNECOLOGY
Payer: COMMERCIAL

## 2024-05-16 DIAGNOSIS — R10.9 ABDOMINAL PAIN, UNSPECIFIED ABDOMINAL LOCATION: ICD-10-CM

## 2024-05-16 PROCEDURE — A9698 NON-RAD CONTRAST MATERIALNOC: HCPCS | Mod: PN | Performed by: OBSTETRICS & GYNECOLOGY

## 2024-05-16 PROCEDURE — 74177 CT ABD & PELVIS W/CONTRAST: CPT | Mod: 26,,, | Performed by: RADIOLOGY

## 2024-05-16 PROCEDURE — 25500020 PHARM REV CODE 255: Mod: PN | Performed by: OBSTETRICS & GYNECOLOGY

## 2024-05-16 PROCEDURE — 74177 CT ABD & PELVIS W/CONTRAST: CPT | Mod: TC,PN

## 2024-05-16 RX ADMIN — IOHEXOL 1000 ML: 12 SOLUTION ORAL at 10:05

## 2024-05-16 RX ADMIN — IOHEXOL 100 ML: 350 INJECTION, SOLUTION INTRAVENOUS at 10:05

## 2024-05-16 NOTE — TELEPHONE ENCOUNTER
Just to keep you updated. Her bruising was very extensive and there was a firm area that made me concerned for a hematoma but no drainage and the incision looked good. We got a CT scan that shows a hematoma. She has no anemia symptoms though her H/H did go down to 8.4/26.  I told her to observe closely and let us know if any issues.

## 2024-05-20 ENCOUNTER — PATIENT MESSAGE (OUTPATIENT)
Dept: OBSTETRICS AND GYNECOLOGY | Facility: CLINIC | Age: 39
End: 2024-05-20
Payer: COMMERCIAL

## 2024-05-30 ENCOUNTER — PATIENT MESSAGE (OUTPATIENT)
Dept: OBSTETRICS AND GYNECOLOGY | Facility: CLINIC | Age: 39
End: 2024-05-30
Payer: COMMERCIAL

## 2024-05-30 ENCOUNTER — TELEPHONE (OUTPATIENT)
Dept: OBSTETRICS AND GYNECOLOGY | Facility: CLINIC | Age: 39
End: 2024-05-30
Payer: COMMERCIAL

## 2024-06-20 ENCOUNTER — OFFICE VISIT (OUTPATIENT)
Dept: OBSTETRICS AND GYNECOLOGY | Facility: CLINIC | Age: 39
End: 2024-06-20
Payer: COMMERCIAL

## 2024-06-20 VITALS — WEIGHT: 220 LBS | SYSTOLIC BLOOD PRESSURE: 123 MMHG | DIASTOLIC BLOOD PRESSURE: 83 MMHG | BODY MASS INDEX: 32.49 KG/M2

## 2024-06-20 DIAGNOSIS — Z09 POSTOP CHECK: Primary | ICD-10-CM

## 2024-06-20 PROBLEM — Z98.890 S/P MYOMECTOMY: Status: RESOLVED | Noted: 2024-05-07 | Resolved: 2024-06-20

## 2024-06-20 PROCEDURE — 3079F DIAST BP 80-89 MM HG: CPT | Mod: CPTII,S$GLB,, | Performed by: OBSTETRICS & GYNECOLOGY

## 2024-06-20 PROCEDURE — 1159F MED LIST DOCD IN RCRD: CPT | Mod: CPTII,S$GLB,, | Performed by: OBSTETRICS & GYNECOLOGY

## 2024-06-20 PROCEDURE — 99999 PR PBB SHADOW E&M-EST. PATIENT-LVL III: CPT | Mod: PBBFAC,,, | Performed by: OBSTETRICS & GYNECOLOGY

## 2024-06-20 PROCEDURE — 99024 POSTOP FOLLOW-UP VISIT: CPT | Mod: S$GLB,,, | Performed by: OBSTETRICS & GYNECOLOGY

## 2024-06-20 PROCEDURE — 3074F SYST BP LT 130 MM HG: CPT | Mod: CPTII,S$GLB,, | Performed by: OBSTETRICS & GYNECOLOGY

## 2024-06-20 PROCEDURE — 1160F RVW MEDS BY RX/DR IN RCRD: CPT | Mod: CPTII,S$GLB,, | Performed by: OBSTETRICS & GYNECOLOGY

## 2024-06-20 NOTE — PROGRESS NOTES
CC: Postoperative visit    Saad Ayon is a 39 y.o. female  presents for a postoperative visit s/p abdominal myomectomy on 2024.  Her postoperative course was uncomplicated.  She is doing well postoperative.    FINDINGS/KEY COMPONENTS: Uterus approximately 18 week size. Multiple fibroids - pedunculated, degenerating fibroid, large posterior intramural fibroid, multiple smaller intramural and subserosal fibroids - one intramural fibroid impinging on the cavity. Normal tubes and ovaries bilaterally. Patient is NOT a candidate for a vaginal delivery.     Pathology showed:  Final Pathologic Diagnosis Fibroids, myomectomy:  Leiomyomata  Negative for atypia or malignancy       /83   Wt 99.8 kg (220 lb 0.3 oz)   LMP 06/15/2024   BMI 32.49 kg/m²     ROS:  GENERAL: No fever, chills, fatigability or weight loss.  VULVAR: No pain, no lesions and no itching.  VAGINAL: No relaxation, no itching, no discharge, no abnormal bleeding and no lesions.  ABDOMEN: No abdominal pain. Denies nausea. Denies vomiting. No diarrhea. No constipation  BREAST: Denies pain. No lumps. No discharge.  URINARY: No incontinence, no nocturia, no frequency and no dysuria.  CARDIOVASCULAR: No chest pain. No shortness of breath. No leg cramps.  NEUROLOGICAL: No headaches. No vision changes.    Physical Exam  APPEARANCE: Well nourished, well developed, in no acute distress.  AFFECT: WNL, alert and oriented x 3  SKIN: No acne or hirsutism  ABDOMEN: Soft.  No tenderness or masses.  No hepatosplenomegaly.  No hernias.  INCISIONS: Clean, dry, intact. Well healed.   PELVIC: Normal external genitalia without lesions.  Normal hair distribution.  Adequate perineal body, normal urethral meatus.  Vagina moist and well rugated without lesions or discharge.  Cervix pink, without lesions, discharge or tenderness.  No significant cystocele or rectocele.  Bimanual exam shows uterus to be normal size, regular, mobile and nontender.  Adnexa without  masses or tenderness.    EXTREMITIES: No edema.      1. Postop check            Patient can return to normal activities. Delay conception for 4 months.     Return to clinic in 1 year for well woman exam.

## 2024-09-12 ENCOUNTER — OFFICE VISIT (OUTPATIENT)
Dept: URGENT CARE | Facility: CLINIC | Age: 39
End: 2024-09-12
Payer: COMMERCIAL

## 2024-09-12 VITALS
OXYGEN SATURATION: 98 % | RESPIRATION RATE: 18 BRPM | HEART RATE: 93 BPM | BODY MASS INDEX: 32.92 KG/M2 | HEIGHT: 69 IN | TEMPERATURE: 99 F | WEIGHT: 222.25 LBS | DIASTOLIC BLOOD PRESSURE: 77 MMHG | SYSTOLIC BLOOD PRESSURE: 128 MMHG

## 2024-09-12 DIAGNOSIS — R30.0 DYSURIA: Primary | ICD-10-CM

## 2024-09-12 DIAGNOSIS — N30.01 ACUTE CYSTITIS WITH HEMATURIA: ICD-10-CM

## 2024-09-12 DIAGNOSIS — B37.9 ANTIBIOTIC-INDUCED YEAST INFECTION: ICD-10-CM

## 2024-09-12 DIAGNOSIS — N76.0 ACUTE VAGINITIS: ICD-10-CM

## 2024-09-12 DIAGNOSIS — T36.95XA ANTIBIOTIC-INDUCED YEAST INFECTION: ICD-10-CM

## 2024-09-12 LAB
BILIRUBIN, UA POC OHS: NEGATIVE
BLOOD, UA POC OHS: ABNORMAL
CLARITY, UA POC OHS: CLEAR
COLOR, UA POC OHS: YELLOW
GLUCOSE, UA POC OHS: NEGATIVE
KETONES, UA POC OHS: NEGATIVE
LEUKOCYTES, UA POC OHS: NEGATIVE
NITRITE, UA POC OHS: NEGATIVE
PH, UA POC OHS: 6
PROTEIN, UA POC OHS: NEGATIVE
SPECIFIC GRAVITY, UA POC OHS: 1.01
UROBILINOGEN, UA POC OHS: 0.2

## 2024-09-12 PROCEDURE — 81514 NFCT DS BV&VAGINITIS DNA ALG: CPT | Performed by: NURSE PRACTITIONER

## 2024-09-12 PROCEDURE — 99214 OFFICE O/P EST MOD 30 MIN: CPT | Mod: S$GLB,,, | Performed by: NURSE PRACTITIONER

## 2024-09-12 PROCEDURE — 81003 URINALYSIS AUTO W/O SCOPE: CPT | Mod: QW,S$GLB,, | Performed by: NURSE PRACTITIONER

## 2024-09-12 PROCEDURE — 87086 URINE CULTURE/COLONY COUNT: CPT | Performed by: NURSE PRACTITIONER

## 2024-09-12 RX ORDER — FLUCONAZOLE 150 MG/1
TABLET ORAL
Qty: 3 TABLET | Refills: 0 | Status: SHIPPED | OUTPATIENT
Start: 2024-09-12

## 2024-09-12 RX ORDER — NITROFURANTOIN 25; 75 MG/1; MG/1
100 CAPSULE ORAL 2 TIMES DAILY
Qty: 14 CAPSULE | Refills: 0 | Status: SHIPPED | OUTPATIENT
Start: 2024-09-12 | End: 2024-09-19

## 2024-09-12 NOTE — PATIENT INSTRUCTIONS
PLEASE READ YOUR DISCHARGE INSTRUCTIONS ENTIRELY AS IT CONTAINS IMPORTANT INFORMATION.    Take the antibiotics to completion. Start a OTC probiotic while taking antibiotics to help replenish your GI randy affected by antibiotic use.   Drink plenty of fluids, avoid caffeine and/or sugary beverages. wipe front to back, take showers not baths, no scented soaps, wear breathable cotton underwear, urinate after sexual intercourse and avoid holding your urination for prolonged periods at a time.   A urine culture was sent. You will be contacted once it results and appropriate action will be taken if needed.   - DIFLUCAN ONCE AS DIRECTED may repeat in 3 days if symptoms persist.   - Avoid tight clothing and harsh soaps/perfumes.   - Continue hygiene upkeep. Keep genial area cleaned with only water.  - Please follow up with your primary care doctor or OBGYN in the next 48-72 hrs as needed.    Please go to the ER for worsening symptoms including fever, worsening flank pain, vomiting, etc.     Please arrange follow up with your primary medical clinic as soon as possible. You must understand that you've received an Urgent Care treatment only and that you may be released before all of your medical problems are known or treated. You, the patient, will arrange for follow up as instructed. If your symptoms worsen or fail to improve you should go to the Emergency Room.  WE CANNOT RULE OUT ALL POSSIBLE CAUSES OF YOUR SYMPTOMS IN THE URGENT CARE SETTING PLEASE GO TO THE ER IF YOU FEELS YOUR CONDITION IS WORSENING OR YOU WOULD LIKE EMERGENT EVALUATION.

## 2024-09-12 NOTE — PROGRESS NOTES
"Subjective:      Patient ID: Saad Ayon is a 39 y.o. female.    Vitals:  height is 5' 9" (1.753 m) and weight is 100.8 kg (222 lb 3.6 oz). Her oral temperature is 98.9 °F (37.2 °C). Her blood pressure is 128/77 and her pulse is 93. Her respiration is 18 and oxygen saturation is 98%.     Chief Complaint: Urinary Tract Infection (Started last night/)    Sada Ayon is a 39 year old female whom presents to urgent care for evaluation of a possible UTI. She states that last night she had bad groin pain after every urination. Also having frequency, vaginal irritation and dribbling.    Urinary Tract Infection   This is a new problem. The current episode started yesterday. The problem occurs every urination. The problem has been unchanged. The pain is at a severity of 0/10. The patient is experiencing no pain. There has been no fever. Associated symptoms include frequency. She has tried nothing for the symptoms. The treatment provided no relief.       Genitourinary:  Positive for frequency.      Objective:     Physical Exam   Constitutional: She is oriented to person, place, and time. She appears well-developed.   HENT:   Head: Normocephalic and atraumatic.   Ears:   Right Ear: External ear normal.   Left Ear: External ear normal.   Nose: Nose normal. No nasal deformity. No epistaxis.   Mouth/Throat: Oropharynx is clear and moist and mucous membranes are normal.   Eyes: Lids are normal.   Neck: Trachea normal and phonation normal. Neck supple.   Cardiovascular: Normal pulses.   Pulmonary/Chest: Effort normal.   Abdominal: Normal appearance and bowel sounds are normal. She exhibits no distension. Soft. There is no abdominal tenderness. There is no left CVA tenderness and no right CVA tenderness.   Neurological: She is alert and oriented to person, place, and time.   Skin: Skin is warm, dry and intact.   Psychiatric: Her speech is normal and behavior is normal.   Nursing note and vitals reviewed.    Results for " orders placed or performed in visit on 09/12/24   POCT Urinalysis(Instrument)   Result Value Ref Range    Color, POC UA Yellow Yellow, Straw, Colorless    Clarity, POC UA Clear Clear    Glucose, POC UA Negative Negative    Bilirubin, POC UA Negative Negative    Ketones, POC UA Negative Negative    Spec Grav POC UA 1.015 1.005 - 1.030    Blood, POC UA Trace-lysed (A) Negative    pH, POC UA 6.0 5.0 - 8.0    Protein, POC UA Negative Negative    Urobilinogen, POC UA 0.2 <=1.0    Nitrite, POC UA Negative Negative    WBC, POC UA Negative Negative       Assessment:     1. Dysuria    2. Acute vaginitis    3. Acute cystitis with hematuria    4. Antibiotic-induced yeast infection        Plan:       Dysuria  -     POCT Urinalysis(Instrument)  -     Vaginosis Screen by DNA Probe  -     Urine culture  -     nitrofurantoin, macrocrystal-monohydrate, (MACROBID) 100 MG capsule; Take 1 capsule (100 mg total) by mouth 2 (two) times daily. for 7 days  Dispense: 14 capsule; Refill: 0    Acute vaginitis  -     Vaginosis Screen by DNA Probe  -     fluconazole (DIFLUCAN) 150 MG Tab; Take 1 tablet by mouth once. Repeat dose every  3 days if needed up to 3 doses  Dispense: 3 tablet; Refill: 0    Acute cystitis with hematuria  -     Urine culture  -     nitrofurantoin, macrocrystal-monohydrate, (MACROBID) 100 MG capsule; Take 1 capsule (100 mg total) by mouth 2 (two) times daily. for 7 days  Dispense: 14 capsule; Refill: 0    Antibiotic-induced yeast infection  -     fluconazole (DIFLUCAN) 150 MG Tab; Take 1 tablet by mouth once. Repeat dose every  3 days if needed up to 3 doses  Dispense: 3 tablet; Refill: 0          Medical Decision Making:   Clinical Tests:   Lab Tests: Ordered and Reviewed  Urgent Care Management:  Previous encounters and labs were independently reviewed. Discussed with patient  all pertinent information and results. Discussed patient diagnosis and plan of treatment. Additional plan of care as outlined above. Patient   was given all follow up and return instructions. All questions and concerns were addressed at this time. Patient  expresses understanding of information and instructions, and is comfortable with plan.    Patient was instructed to follow up with her Primary Care Provider if no improvement in symptoms in 2-3 DAYS:  or go to ED immediately for any worsening or change in current symptoms.             Patient Instructions   PLEASE READ YOUR DISCHARGE INSTRUCTIONS ENTIRELY AS IT CONTAINS IMPORTANT INFORMATION.    Take the antibiotics to completion. Start a OTC probiotic while taking antibiotics to help replenish your GI randy affected by antibiotic use.   Drink plenty of fluids, avoid caffeine and/or sugary beverages. wipe front to back, take showers not baths, no scented soaps, wear breathable cotton underwear, urinate after sexual intercourse and avoid holding your urination for prolonged periods at a time.   A urine culture was sent. You will be contacted once it results and appropriate action will be taken if needed.   - DIFLUCAN ONCE AS DIRECTED may repeat in 3 days if symptoms persist.   - Avoid tight clothing and harsh soaps/perfumes.   - Continue hygiene upkeep. Keep genial area cleaned with only water.  - Please follow up with your primary care doctor or OBGYN in the next 48-72 hrs as needed.    Please go to the ER for worsening symptoms including fever, worsening flank pain, vomiting, etc.     Please arrange follow up with your primary medical clinic as soon as possible. You must understand that you've received an Urgent Care treatment only and that you may be released before all of your medical problems are known or treated. You, the patient, will arrange for follow up as instructed. If your symptoms worsen or fail to improve you should go to the Emergency Room.  WE CANNOT RULE OUT ALL POSSIBLE CAUSES OF YOUR SYMPTOMS IN THE URGENT CARE SETTING PLEASE GO TO THE ER IF YOU FEELS YOUR CONDITION IS WORSENING OR YOU  WOULD LIKE EMERGENT EVALUATION.

## 2024-09-13 LAB
BACTERIA UR CULT: NORMAL
BACTERIA UR CULT: NORMAL
BACTERIAL VAGINOSIS DNA: NEGATIVE
CANDIDA GLABRATA DNA: POSITIVE
CANDIDA KRUSEI DNA: NEGATIVE
CANDIDA RRNA VAG QL PROBE: NEGATIVE
T VAGINALIS RRNA GENITAL QL PROBE: NEGATIVE

## (undated) DEVICE — DRESSING AQUACEL AG ADV 3.5X6

## (undated) DEVICE — SOL IRR STRL WATER 500ML

## (undated) DEVICE — DECANTER BAG FLUID TRANSFER

## (undated) DEVICE — STRIP MEDI WND CLSR 1/2X4IN

## (undated) DEVICE — APPLICATOR CHLORAPREP ORN 26ML

## (undated) DEVICE — SUT 1 36IN PDS II VIO MONO

## (undated) DEVICE — COVER OVERHEAD SURG LT BLUE

## (undated) DEVICE — SYR 10CC LUER LOCK

## (undated) DEVICE — PAD CNTOUR SUP-ABSRB POSTPRTM

## (undated) DEVICE — SUT MONOCRYL 4-0 PS-1 UND

## (undated) DEVICE — SUT 2/0 36IN COATED VICRYL

## (undated) DEVICE — TOWEL OR DISP STRL BLUE 4/PK

## (undated) DEVICE — SUT MONOCRYL 3-0 PS-1

## (undated) DEVICE — GLOVE BIOGEL SKINSENSE PI 6.5

## (undated) DEVICE — CATH URETHRAL RED 16FR

## (undated) DEVICE — DRAPE T TRNSVRS LAP 102X78X121

## (undated) DEVICE — SEALER LIGASURE CRV 18.8CM

## (undated) DEVICE — PACK SURGERY START

## (undated) DEVICE — GOWN POLY REINF BRTH SLV XL

## (undated) DEVICE — GLOVE BIOGEL PIMICRO INDIC 6.5

## (undated) DEVICE — TRAY FOLEY 16FR INFECTION CONT

## (undated) DEVICE — SEE MEDLINE ITEM 154981

## (undated) DEVICE — NDL SPINAL SPINOCAN 22GX3.5

## (undated) DEVICE — DRAPE FLUID WARMER ORS 44X44IN

## (undated) DEVICE — CONTAINER SPECIMEN NS 86OZ

## (undated) DEVICE — SUT 2/0 27IN PDS II VIO MO

## (undated) DEVICE — PACK ECLIPSE BASIC III SURG

## (undated) DEVICE — DRESSING AQUACEL SACRAL 9 X 9

## (undated) DEVICE — ELECTRODE REM PLYHSV RETURN 9

## (undated) DEVICE — GOWN POLY REINF BRTH SLV LG